# Patient Record
Sex: FEMALE | Race: BLACK OR AFRICAN AMERICAN | NOT HISPANIC OR LATINO | Employment: OTHER | URBAN - METROPOLITAN AREA
[De-identification: names, ages, dates, MRNs, and addresses within clinical notes are randomized per-mention and may not be internally consistent; named-entity substitution may affect disease eponyms.]

---

## 2023-10-12 ENCOUNTER — HOSPITAL ENCOUNTER (OUTPATIENT)
Facility: OTHER | Age: 53
Discharge: HOSPICE | End: 2023-10-14
Attending: EMERGENCY MEDICINE | Admitting: EMERGENCY MEDICINE

## 2023-10-12 DIAGNOSIS — K66.8 PNEUMOPERITONEUM: ICD-10-CM

## 2023-10-12 LAB
BASOPHILS # BLD AUTO: 0.02 K/UL (ref 0–0.2)
BASOPHILS NFR BLD: 0.1 % (ref 0–1.9)
DIFFERENTIAL METHOD: ABNORMAL
EOSINOPHIL # BLD AUTO: 0 K/UL (ref 0–0.5)
EOSINOPHIL NFR BLD: 0.1 % (ref 0–8)
ERYTHROCYTE [DISTWIDTH] IN BLOOD BY AUTOMATED COUNT: 17.2 % (ref 11.5–14.5)
HCT VFR BLD AUTO: 39.9 % (ref 37–48.5)
HGB BLD-MCNC: 12.4 G/DL (ref 12–16)
IMM GRANULOCYTES # BLD AUTO: 0.12 K/UL (ref 0–0.04)
IMM GRANULOCYTES NFR BLD AUTO: 0.6 % (ref 0–0.5)
LYMPHOCYTES # BLD AUTO: 0.4 K/UL (ref 1–4.8)
LYMPHOCYTES NFR BLD: 2.1 % (ref 18–48)
MCH RBC QN AUTO: 29 PG (ref 27–31)
MCHC RBC AUTO-ENTMCNC: 31.1 G/DL (ref 32–36)
MCV RBC AUTO: 93 FL (ref 82–98)
MONOCYTES # BLD AUTO: 0.7 K/UL (ref 0.3–1)
MONOCYTES NFR BLD: 3.6 % (ref 4–15)
NEUTROPHILS # BLD AUTO: 17.9 K/UL (ref 1.8–7.7)
NEUTROPHILS NFR BLD: 93.5 % (ref 38–73)
NRBC BLD-RTO: 0 /100 WBC
PLATELET # BLD AUTO: 199 K/UL (ref 150–450)
PMV BLD AUTO: 11.2 FL (ref 9.2–12.9)
RBC # BLD AUTO: 4.28 M/UL (ref 4–5.4)
WBC # BLD AUTO: 19.11 K/UL (ref 3.9–12.7)

## 2023-10-12 PROCEDURE — 80053 COMPREHEN METABOLIC PANEL: CPT | Performed by: EMERGENCY MEDICINE

## 2023-10-12 PROCEDURE — 25000003 PHARM REV CODE 250: Performed by: EMERGENCY MEDICINE

## 2023-10-12 PROCEDURE — 99285 EMERGENCY DEPT VISIT HI MDM: CPT

## 2023-10-12 PROCEDURE — 85025 COMPLETE CBC W/AUTO DIFF WBC: CPT | Performed by: EMERGENCY MEDICINE

## 2023-10-12 RX ORDER — OXYCODONE AND ACETAMINOPHEN 5; 325 MG/1; MG/1
1 TABLET ORAL
Status: COMPLETED | OUTPATIENT
Start: 2023-10-12 | End: 2023-10-12

## 2023-10-12 RX ORDER — HYOSCYAMINE SULFATE 0.12 MG/1
0.12 TABLET SUBLINGUAL EVERY 4 HOURS PRN
COMMUNITY

## 2023-10-12 RX ORDER — MORPHINE SULFATE 30 MG/1
30 TABLET, FILM COATED, EXTENDED RELEASE ORAL 2 TIMES DAILY
COMMUNITY

## 2023-10-12 RX ORDER — OXYCODONE AND ACETAMINOPHEN 10; 325 MG/1; MG/1
1 TABLET ORAL EVERY 8 HOURS PRN
COMMUNITY

## 2023-10-12 RX ADMIN — OXYCODONE HYDROCHLORIDE AND ACETAMINOPHEN 1 TABLET: 5; 325 TABLET ORAL at 11:10

## 2023-10-13 PROBLEM — K66.8 PNEUMOPERITONEUM: Status: ACTIVE | Noted: 2023-10-13

## 2023-10-13 PROBLEM — E43 SEVERE PROTEIN-CALORIE MALNUTRITION: Status: ACTIVE | Noted: 2023-10-13

## 2023-10-13 PROBLEM — C78.7 METASTATIC COLON CANCER TO LIVER: Status: ACTIVE | Noted: 2023-10-13

## 2023-10-13 PROBLEM — C18.9 METASTATIC COLON CANCER TO LIVER: Status: ACTIVE | Noted: 2023-10-13

## 2023-10-13 PROBLEM — Z71.89 ADVANCE CARE PLANNING: Status: ACTIVE | Noted: 2023-10-13

## 2023-10-13 PROBLEM — N17.9 ACUTE KIDNEY INJURY: Status: ACTIVE | Noted: 2023-10-13

## 2023-10-13 LAB
ALBUMIN SERPL BCP-MCNC: 2 G/DL (ref 3.5–5.2)
ALBUMIN SERPL BCP-MCNC: 2 G/DL (ref 3.5–5.2)
ALP SERPL-CCNC: 185 U/L (ref 55–135)
ALP SERPL-CCNC: 191 U/L (ref 55–135)
ALT SERPL W/O P-5'-P-CCNC: 10 U/L (ref 10–44)
ALT SERPL W/O P-5'-P-CCNC: 9 U/L (ref 10–44)
ANION GAP SERPL CALC-SCNC: 12 MMOL/L (ref 8–16)
ANION GAP SERPL CALC-SCNC: 15 MMOL/L (ref 8–16)
AST SERPL-CCNC: 24 U/L (ref 10–40)
AST SERPL-CCNC: 27 U/L (ref 10–40)
BASOPHILS # BLD AUTO: 0.01 K/UL (ref 0–0.2)
BASOPHILS NFR BLD: 0.1 % (ref 0–1.9)
BILIRUB SERPL-MCNC: 0.3 MG/DL (ref 0.1–1)
BILIRUB SERPL-MCNC: 0.4 MG/DL (ref 0.1–1)
BUN SERPL-MCNC: 64 MG/DL (ref 6–20)
BUN SERPL-MCNC: 65 MG/DL (ref 6–20)
CALCIUM SERPL-MCNC: 8.4 MG/DL (ref 8.7–10.5)
CALCIUM SERPL-MCNC: 8.5 MG/DL (ref 8.7–10.5)
CHLORIDE SERPL-SCNC: 102 MMOL/L (ref 95–110)
CHLORIDE SERPL-SCNC: 103 MMOL/L (ref 95–110)
CO2 SERPL-SCNC: 21 MMOL/L (ref 23–29)
CO2 SERPL-SCNC: 22 MMOL/L (ref 23–29)
CREAT SERPL-MCNC: 2.1 MG/DL (ref 0.5–1.4)
CREAT SERPL-MCNC: 2.1 MG/DL (ref 0.5–1.4)
DIFFERENTIAL METHOD: ABNORMAL
EOSINOPHIL # BLD AUTO: 0 K/UL (ref 0–0.5)
EOSINOPHIL NFR BLD: 0.2 % (ref 0–8)
ERYTHROCYTE [DISTWIDTH] IN BLOOD BY AUTOMATED COUNT: 17.3 % (ref 11.5–14.5)
EST. GFR  (NO RACE VARIABLE): 27 ML/MIN/1.73 M^2
EST. GFR  (NO RACE VARIABLE): 27 ML/MIN/1.73 M^2
GLUCOSE SERPL-MCNC: 91 MG/DL (ref 70–110)
GLUCOSE SERPL-MCNC: 98 MG/DL (ref 70–110)
HCT VFR BLD AUTO: 38.6 % (ref 37–48.5)
HGB BLD-MCNC: 11.8 G/DL (ref 12–16)
IMM GRANULOCYTES # BLD AUTO: 0.06 K/UL (ref 0–0.04)
IMM GRANULOCYTES NFR BLD AUTO: 0.4 % (ref 0–0.5)
LDH SERPL L TO P-CCNC: 1.56 MMOL/L (ref 0.5–2.2)
LYMPHOCYTES # BLD AUTO: 0.4 K/UL (ref 1–4.8)
LYMPHOCYTES NFR BLD: 2.8 % (ref 18–48)
MAGNESIUM SERPL-MCNC: 2.4 MG/DL (ref 1.6–2.6)
MCH RBC QN AUTO: 29.1 PG (ref 27–31)
MCHC RBC AUTO-ENTMCNC: 30.6 G/DL (ref 32–36)
MCV RBC AUTO: 95 FL (ref 82–98)
MONOCYTES # BLD AUTO: 0.6 K/UL (ref 0.3–1)
MONOCYTES NFR BLD: 4 % (ref 4–15)
NEUTROPHILS # BLD AUTO: 12.6 K/UL (ref 1.8–7.7)
NEUTROPHILS NFR BLD: 92.5 % (ref 38–73)
NRBC BLD-RTO: 0 /100 WBC
PHOSPHATE SERPL-MCNC: 5.2 MG/DL (ref 2.7–4.5)
PLATELET # BLD AUTO: 119 K/UL (ref 150–450)
PMV BLD AUTO: 10.6 FL (ref 9.2–12.9)
POTASSIUM SERPL-SCNC: 4 MMOL/L (ref 3.5–5.1)
POTASSIUM SERPL-SCNC: 4.6 MMOL/L (ref 3.5–5.1)
PROT SERPL-MCNC: 5.9 G/DL (ref 6–8.4)
PROT SERPL-MCNC: 6.1 G/DL (ref 6–8.4)
RBC # BLD AUTO: 4.06 M/UL (ref 4–5.4)
SAMPLE: NORMAL
SODIUM SERPL-SCNC: 137 MMOL/L (ref 136–145)
SODIUM SERPL-SCNC: 138 MMOL/L (ref 136–145)
WBC # BLD AUTO: 13.66 K/UL (ref 3.9–12.7)

## 2023-10-13 PROCEDURE — 99223 1ST HOSP IP/OBS HIGH 75: CPT | Mod: ,,, | Performed by: NURSE PRACTITIONER

## 2023-10-13 PROCEDURE — 99497 ADVNCD CARE PLAN 30 MIN: CPT | Mod: 25,,, | Performed by: INTERNAL MEDICINE

## 2023-10-13 PROCEDURE — G0378 HOSPITAL OBSERVATION PER HR: HCPCS

## 2023-10-13 PROCEDURE — 99233 PR SUBSEQUENT HOSPITAL CARE,LEVL III: ICD-10-PCS | Mod: ,,, | Performed by: STUDENT IN AN ORGANIZED HEALTH CARE EDUCATION/TRAINING PROGRAM

## 2023-10-13 PROCEDURE — 80053 COMPREHEN METABOLIC PANEL: CPT | Performed by: NURSE PRACTITIONER

## 2023-10-13 PROCEDURE — 99223 PR INITIAL HOSPITAL CARE,LEVL III: ICD-10-PCS | Mod: ,,, | Performed by: NURSE PRACTITIONER

## 2023-10-13 PROCEDURE — 96375 TX/PRO/DX INJ NEW DRUG ADDON: CPT

## 2023-10-13 PROCEDURE — 25000003 PHARM REV CODE 250: Performed by: EMERGENCY MEDICINE

## 2023-10-13 PROCEDURE — 63600175 PHARM REV CODE 636 W HCPCS: Performed by: STUDENT IN AN ORGANIZED HEALTH CARE EDUCATION/TRAINING PROGRAM

## 2023-10-13 PROCEDURE — 85025 COMPLETE CBC W/AUTO DIFF WBC: CPT | Performed by: NURSE PRACTITIONER

## 2023-10-13 PROCEDURE — 87040 BLOOD CULTURE FOR BACTERIA: CPT | Performed by: EMERGENCY MEDICINE

## 2023-10-13 PROCEDURE — 25000003 PHARM REV CODE 250: Performed by: NURSE PRACTITIONER

## 2023-10-13 PROCEDURE — 83735 ASSAY OF MAGNESIUM: CPT | Performed by: NURSE PRACTITIONER

## 2023-10-13 PROCEDURE — 36415 COLL VENOUS BLD VENIPUNCTURE: CPT | Performed by: NURSE PRACTITIONER

## 2023-10-13 PROCEDURE — 99205 OFFICE O/P NEW HI 60 MIN: CPT | Mod: ,,, | Performed by: INTERNAL MEDICINE

## 2023-10-13 PROCEDURE — 99233 SBSQ HOSP IP/OBS HIGH 50: CPT | Mod: ,,, | Performed by: STUDENT IN AN ORGANIZED HEALTH CARE EDUCATION/TRAINING PROGRAM

## 2023-10-13 PROCEDURE — 63600175 PHARM REV CODE 636 W HCPCS: Performed by: NURSE PRACTITIONER

## 2023-10-13 PROCEDURE — 63600175 PHARM REV CODE 636 W HCPCS: Performed by: EMERGENCY MEDICINE

## 2023-10-13 PROCEDURE — 99497 PR ADVNCD CARE PLAN 30 MIN: ICD-10-PCS | Mod: 25,,, | Performed by: INTERNAL MEDICINE

## 2023-10-13 PROCEDURE — 96366 THER/PROPH/DIAG IV INF ADDON: CPT

## 2023-10-13 PROCEDURE — 99205 PR OFFICE/OUTPT VISIT, NEW, LEVL V, 60-74 MIN: ICD-10-PCS | Mod: ,,, | Performed by: INTERNAL MEDICINE

## 2023-10-13 PROCEDURE — 25000003 PHARM REV CODE 250: Performed by: STUDENT IN AN ORGANIZED HEALTH CARE EDUCATION/TRAINING PROGRAM

## 2023-10-13 PROCEDURE — 96365 THER/PROPH/DIAG IV INF INIT: CPT

## 2023-10-13 PROCEDURE — 96376 TX/PRO/DX INJ SAME DRUG ADON: CPT

## 2023-10-13 PROCEDURE — 96361 HYDRATE IV INFUSION ADD-ON: CPT

## 2023-10-13 PROCEDURE — 84100 ASSAY OF PHOSPHORUS: CPT | Performed by: NURSE PRACTITIONER

## 2023-10-13 RX ORDER — ONDANSETRON 2 MG/ML
4 INJECTION INTRAMUSCULAR; INTRAVENOUS EVERY 6 HOURS PRN
Status: DISCONTINUED | OUTPATIENT
Start: 2023-10-13 | End: 2023-10-14 | Stop reason: HOSPADM

## 2023-10-13 RX ORDER — NALOXONE HCL 0.4 MG/ML
0.02 VIAL (ML) INJECTION
Status: DISCONTINUED | OUTPATIENT
Start: 2023-10-13 | End: 2023-10-14 | Stop reason: HOSPADM

## 2023-10-13 RX ORDER — OXYCODONE AND ACETAMINOPHEN 10; 325 MG/1; MG/1
1 TABLET ORAL EVERY 8 HOURS PRN
Status: DISCONTINUED | OUTPATIENT
Start: 2023-10-13 | End: 2023-10-14

## 2023-10-13 RX ORDER — SODIUM CHLORIDE 9 MG/ML
INJECTION, SOLUTION INTRAVENOUS CONTINUOUS
Status: DISCONTINUED | OUTPATIENT
Start: 2023-10-13 | End: 2023-10-14 | Stop reason: HOSPADM

## 2023-10-13 RX ORDER — ONDANSETRON 2 MG/ML
4 INJECTION INTRAMUSCULAR; INTRAVENOUS EVERY 8 HOURS PRN
Status: DISCONTINUED | OUTPATIENT
Start: 2023-10-13 | End: 2023-10-13

## 2023-10-13 RX ORDER — MORPHINE SULFATE 15 MG/1
30 TABLET, FILM COATED, EXTENDED RELEASE ORAL 2 TIMES DAILY
Status: DISCONTINUED | OUTPATIENT
Start: 2023-10-13 | End: 2023-10-14 | Stop reason: HOSPADM

## 2023-10-13 RX ORDER — SODIUM CHLORIDE 450 MG/100ML
INJECTION, SOLUTION INTRAVENOUS
Status: DISCONTINUED | OUTPATIENT
Start: 2023-10-13 | End: 2023-10-13

## 2023-10-13 RX ORDER — MIDODRINE HYDROCHLORIDE 10 MG/1
10 TABLET ORAL 3 TIMES DAILY
COMMUNITY

## 2023-10-13 RX ORDER — SODIUM CHLORIDE 9 MG/ML
1000 INJECTION, SOLUTION INTRAVENOUS
Status: COMPLETED | OUTPATIENT
Start: 2023-10-13 | End: 2023-10-13

## 2023-10-13 RX ORDER — MORPHINE SULFATE 4 MG/ML
4 INJECTION, SOLUTION INTRAMUSCULAR; INTRAVENOUS EVERY 4 HOURS PRN
Status: DISCONTINUED | OUTPATIENT
Start: 2023-10-13 | End: 2023-10-14 | Stop reason: HOSPADM

## 2023-10-13 RX ORDER — ACETAMINOPHEN 325 MG/1
650 TABLET ORAL EVERY 4 HOURS PRN
Status: DISCONTINUED | OUTPATIENT
Start: 2023-10-13 | End: 2023-10-14 | Stop reason: HOSPADM

## 2023-10-13 RX ORDER — MORPHINE SULFATE 2 MG/ML
2 INJECTION, SOLUTION INTRAMUSCULAR; INTRAVENOUS
Status: COMPLETED | OUTPATIENT
Start: 2023-10-13 | End: 2023-10-13

## 2023-10-13 RX ORDER — SODIUM CHLORIDE 0.9 % (FLUSH) 0.9 %
10 SYRINGE (ML) INJECTION EVERY 8 HOURS PRN
Status: DISCONTINUED | OUTPATIENT
Start: 2023-10-13 | End: 2023-10-14 | Stop reason: HOSPADM

## 2023-10-13 RX ORDER — MIDODRINE HYDROCHLORIDE 5 MG/1
5 TABLET ORAL EVERY 8 HOURS
Status: DISCONTINUED | OUTPATIENT
Start: 2023-10-13 | End: 2023-10-14 | Stop reason: HOSPADM

## 2023-10-13 RX ADMIN — MIDODRINE HYDROCHLORIDE 5 MG: 5 TABLET ORAL at 09:10

## 2023-10-13 RX ADMIN — MIDODRINE HYDROCHLORIDE 5 MG: 5 TABLET ORAL at 05:10

## 2023-10-13 RX ADMIN — ONDANSETRON 4 MG: 2 INJECTION INTRAMUSCULAR; INTRAVENOUS at 09:10

## 2023-10-13 RX ADMIN — SODIUM CHLORIDE: 9 INJECTION, SOLUTION INTRAVENOUS at 10:10

## 2023-10-13 RX ADMIN — PIPERACILLIN AND TAZOBACTAM 4.5 G: 4; .5 INJECTION, POWDER, LYOPHILIZED, FOR SOLUTION INTRAVENOUS; PARENTERAL at 01:10

## 2023-10-13 RX ADMIN — OXYCODONE HYDROCHLORIDE AND ACETAMINOPHEN 1 TABLET: 10; 325 TABLET ORAL at 01:10

## 2023-10-13 RX ADMIN — ONDANSETRON 4 MG: 2 INJECTION INTRAMUSCULAR; INTRAVENOUS at 02:10

## 2023-10-13 RX ADMIN — MORPHINE SULFATE 4 MG: 4 INJECTION, SOLUTION INTRAMUSCULAR; INTRAVENOUS at 05:10

## 2023-10-13 RX ADMIN — SODIUM CHLORIDE: 9 INJECTION, SOLUTION INTRAVENOUS at 03:10

## 2023-10-13 RX ADMIN — PIPERACILLIN AND TAZOBACTAM 4.5 G: 4; .5 INJECTION, POWDER, LYOPHILIZED, FOR SOLUTION INTRAVENOUS; PARENTERAL at 02:10

## 2023-10-13 RX ADMIN — MORPHINE SULFATE 2 MG: 2 INJECTION, SOLUTION INTRAMUSCULAR; INTRAVENOUS at 01:10

## 2023-10-13 RX ADMIN — OXYCODONE HYDROCHLORIDE AND ACETAMINOPHEN 1 TABLET: 10; 325 TABLET ORAL at 09:10

## 2023-10-13 RX ADMIN — PROMETHAZINE HYDROCHLORIDE 6.25 MG: 25 INJECTION INTRAMUSCULAR; INTRAVENOUS at 05:10

## 2023-10-13 RX ADMIN — SODIUM CHLORIDE 1000 ML: 9 INJECTION, SOLUTION INTRAVENOUS at 02:10

## 2023-10-13 RX ADMIN — MIDODRINE HYDROCHLORIDE 5 MG: 5 TABLET ORAL at 02:10

## 2023-10-13 NOTE — SUBJECTIVE & OBJECTIVE
Past Medical History:   Diagnosis Date    Acute kidney injury superimposed on chronic kidney disease     Ascites     Colon cancer        History reviewed. No pertinent surgical history.    Review of patient's allergies indicates:  No Known Allergies    No current facility-administered medications on file prior to encounter.     Current Outpatient Medications on File Prior to Encounter   Medication Sig    hyoscyamine (LEVSIN/SL) 0.125 mg Subl Place 0.125 mg under the tongue every 4 (four) hours as needed.    midodrine (PROAMATINE) 10 MG tablet Take 10 mg by mouth 3 (three) times daily. Take one-half tablet (5mg total) by mouth in the morning and one-half tablet (5mg total) at noon and one-half tablet (5mg total) in the evening. Take with meals.    morphine (MS CONTIN) 30 MG 12 hr tablet Take 30 mg by mouth 2 (two) times daily.    oxyCODONE-acetaminophen (PERCOCET)  mg per tablet Take 1 tablet by mouth every 8 (eight) hours as needed for Pain.     Family History    None       Tobacco Use    Smoking status: Never    Smokeless tobacco: Never   Substance and Sexual Activity    Alcohol use: Never    Drug use: Never    Sexual activity: Not on file     Review of Systems   Constitutional:  Positive for appetite change and fatigue. Negative for activity change and fever.   HENT:  Negative for congestion, ear pain, rhinorrhea and sinus pressure.    Eyes:  Negative for pain and discharge.   Respiratory:  Negative for cough, chest tightness, shortness of breath and wheezing.    Cardiovascular:  Negative for chest pain and leg swelling.   Gastrointestinal:  Positive for abdominal pain, nausea and vomiting. Negative for abdominal distention and diarrhea.   Endocrine: Negative for cold intolerance and heat intolerance.   Genitourinary:  Negative for difficulty urinating, flank pain, frequency, hematuria and urgency.   Musculoskeletal:  Positive for myalgias. Negative for arthralgias and joint swelling.   Allergic/Immunologic:  Negative for environmental allergies and food allergies.   Neurological:  Negative for dizziness, weakness, light-headedness and headaches.   Hematological:  Does not bruise/bleed easily.   Psychiatric/Behavioral:  Negative for agitation, behavioral problems and decreased concentration.      Objective:     Vital Signs (Most Recent):  Temp: 97.4 °F (36.3 °C) (10/13/23 0300)  Pulse: 61 (10/13/23 0300)  Resp: 14 (10/13/23 0300)  BP: (!) 92/59 (10/13/23 0300)  SpO2: 98 % (10/13/23 0300) Vital Signs (24h Range):  Temp:  [97.4 °F (36.3 °C)-97.5 °F (36.4 °C)] 97.4 °F (36.3 °C)  Pulse:  [61-85] 61  Resp:  [14-18] 14  SpO2:  [98 %-100 %] 98 %  BP: (83-95)/(57-62) 92/59     Weight: 36.3 kg (80 lb)  Body mass index is 15.62 kg/m².     Physical Exam  Constitutional:       Appearance: She is well-developed and underweight. She is ill-appearing.   HENT:      Head: Normocephalic.   Eyes:      General:         Right eye: No discharge.         Left eye: No discharge.      Conjunctiva/sclera: Conjunctivae normal.   Cardiovascular:      Rate and Rhythm: Normal rate and regular rhythm.      Pulses:           Radial pulses are 2+ on the right side and 2+ on the left side.      Heart sounds: Normal heart sounds.   Pulmonary:      Effort: Pulmonary effort is normal. No respiratory distress.      Breath sounds: Normal breath sounds.   Abdominal:      General: Bowel sounds are decreased. There is no distension.      Palpations: Abdomen is soft.      Tenderness: There is generalized abdominal tenderness.   Musculoskeletal:         General: Normal range of motion.      Cervical back: Normal range of motion and neck supple.   Skin:     General: Skin is warm and dry.      Coloration: Skin is pale.   Neurological:      Mental Status: She is alert and oriented to person, place, and time.      GCS: GCS eye subscore is 4. GCS verbal subscore is 5. GCS motor subscore is 6.      Motor: Motor function is intact.   Psychiatric:         Mood and Affect:  Mood is depressed.         Speech: Speech is delayed.         Behavior: Behavior is withdrawn.         Thought Content: Thought content normal.                Significant Labs: All pertinent labs within the past 24 hours have been reviewed.  CBC:   Recent Labs   Lab 10/12/23  2337   WBC 19.11*   HGB 12.4   HCT 39.9        CMP:   Recent Labs   Lab 10/12/23  2337      K 4.6      CO2 21*   GLU 98   BUN 64*   CREATININE 2.1*   CALCIUM 8.5*   PROT 6.1   ALBUMIN 2.0*   BILITOT 0.4   ALKPHOS 191*   AST 27   ALT 10   ANIONGAP 15       Significant Imaging: I have reviewed all pertinent imaging results/findings within the past 24 hours.

## 2023-10-13 NOTE — PLAN OF CARE
SW met with patient, along with daughter, Chani, at bedside.    Patient is alert and oriented with no communication barriers. Patient is bed bound.     Patient verified address is correct on face sheet.    Patient pharmacy of choice is [facility].    Patient denies HH and a WC for DME use.    Patient daughter to provide a ride home at discharge.    CM team to continue to follow.   10/13/23 1009   Discharge Assessment   Assessment Type Discharge Planning Assessment   Confirmed/corrected address, phone number and insurance Yes   Confirmed Demographics Correct on Facesheet   Source of Information patient   Communicated RESHMA with patient/caregiver Date not available/Unable to determine   People in Home child(angelo), adult;grandchild(angelo)   Do you expect to return to your current living situation? No   Prior to hospitilization cognitive status: Alert/Oriented   Current cognitive status: Alert/Oriented   Walking or Climbing Stairs transferring difficulty, dependent   Dressing/Bathing dressing difficulty, dependent   Equipment Currently Used at Home wheelchair   Readmission within 30 days? Yes   Patient currently being followed by outpatient case management? No   Do you currently have service(s) that help you manage your care at home? No   Do you take prescription medications? Yes   Do you have any problems affording any of your prescribed medications? No   Is the patient taking medications as prescribed? yes   How do you get to doctors appointments? family or friend will provide   Are you on dialysis? No   Do you take coumadin? No   DME Needed Upon Discharge  none   Discharge Plan discussed with: Patient;Adult children   Transition of Care Barriers None   Discharge Plan A Hospice/home   Physical Activity   On average, how many days per week do you engage in moderate to strenuous exercise (like a brisk walk)? 0 days   On average, how many minutes do you engage in exercise at this level? 0 min   Financial Resource Strain    How hard is it for you to pay for the very basics like food, housing, medical care, and heating? Not hard   Housing Stability   In the last 12 months, was there a time when you were not able to pay the mortgage or rent on time? N   In the last 12 months, how many places have you lived? 2   In the last 12 months, was there a time when you did not have a steady place to sleep or slept in a shelter (including now)? N   Transportation Needs   In the past 12 months, has lack of transportation kept you from medical appointments or from getting medications? no   In the past 12 months, has lack of transportation kept you from meetings, work, or from getting things needed for daily living? No   Food Insecurity   Within the past 12 months, you worried that your food would run out before you got the money to buy more. Never true   Within the past 12 months, the food you bought just didn't last and you didn't have money to get more. Never true   Stress   Do you feel stress - tense, restless, nervous, or anxious, or unable to sleep at night because your mind is troubled all the time - these days? To some exte   Social Connections   In a typical week, how many times do you talk on the phone with family, friends, or neighbors? More than 3   How often do you get together with friends or relatives? Once   How often do you attend Episcopal or Mormonism services? Never   Do you belong to any clubs or organizations such as Episcopal groups, unions, fraternal or athletic groups, or school groups? No   How often do you attend meetings of the clubs or organizations you belong to? Never   Are you , , , , never , or living with a partner?    Alcohol Use   Q1: How often do you have a drink containing alcohol? Never   Q2: How many drinks containing alcohol do you have on a typical day when you are drinking? None   Q3: How often do you have six or more drinks on one occasion? Never   OTHER   Name(s) of  People in Home Eri (daughter) and grandchildren     [facility] - Med Surg (Mariya)  Initial Discharge Assessment       Primary Care Provider: No, Primary Doctor    Admission Diagnosis: Pneumoperitoneum [K66.8]    Admission Date: 10/12/2023  Expected Discharge Date:     Transition of Care Barriers: (P) None    Payor: /     Extended Emergency Contact Information  Primary Emergency Contact: eri abreu  Mobile Phone: [phone]  Relation: Daughter  Preferred language: English   needed? No    Discharge Plan A: (P) Hospice/home       No Pharmacies Listed    Initial Assessment (most recent)       Adult Discharge Assessment - 10/13/23 1009          Discharge Assessment    Assessment Type Discharge Planning Assessment (P)      Confirmed/corrected address, phone number and insurance Yes (P)      Confirmed Demographics Correct on Facesheet (P)      Source of Information patient (P)      Communicated RESHMA with patient/caregiver Date not available/Unable to determine (P)      People in Home child(angelo), adult;grandchild(angelo) (P)      Do you expect to return to your current living situation? No (P)      Prior to hospitilization cognitive status: Alert/Oriented (P)      Current cognitive status: Alert/Oriented (P)      Walking or Climbing Stairs transferring difficulty, dependent (P)      Dressing/Bathing dressing difficulty, dependent (P)      Equipment Currently Used at Home wheelchair (P)      Readmission within 30 days? Yes (P)      Patient currently being followed by outpatient case management? No (P)      Do you currently have service(s) that help you manage your care at home? No (P)      Do you take prescription medications? Yes (P)      Do you have any problems affording any of your prescribed medications? No (P)      Is the patient taking medications as prescribed? yes (P)      How do you get to doctors appointments? family or friend will provide (P)      Are you on dialysis? No (P)      Do you take  coumadin? No (P)      DME Needed Upon Discharge  none (P)      Discharge Plan discussed with: Patient;Adult children (P)      Transition of Care Barriers None (P)      Discharge Plan A Hospice/home (P)         Physical Activity    On average, how many days per week do you engage in moderate to strenuous exercise (like a brisk walk)? 0 days (P)      On average, how many minutes do you engage in exercise at this level? 0 min (P)         Financial Resource Strain    How hard is it for you to pay for the very basics like food, housing, medical care, and heating? Not hard at all (P)         Housing Stability    In the last 12 months, was there a time when you were not able to pay the mortgage or rent on time? No (P)      In the last 12 months, how many places have you lived? 2 (P)      In the last 12 months, was there a time when you did not have a steady place to sleep or slept in a shelter (including now)? No (P)         Transportation Needs    In the past 12 months, has lack of transportation kept you from medical appointments or from getting medications? No (P)      In the past 12 months, has lack of transportation kept you from meetings, work, or from getting things needed for daily living? No (P)         Food Insecurity    Within the past 12 months, you worried that your food would run out before you got the money to buy more. Never true (P)      Within the past 12 months, the food you bought just didn't last and you didn't have money to get more. Never true (P)         Stress    Do you feel stress - tense, restless, nervous, or anxious, or unable to sleep at night because your mind is troubled all the time - these days? To some extent (P)         Social Connections    In a typical week, how many times do you talk on the phone with family, friends, or neighbors? More than three times a week (P)      How often do you get together with friends or relatives? Once a week (P)      How often do you attend Muslim or  Sabianist services? Never (P)      Do you belong to any clubs or organizations such as Nondenominational groups, unions, fraternal or athletic groups, or school groups? No (P)      How often do you attend meetings of the clubs or organizations you belong to? Never (P)      Are you , , , , never , or living with a partner?  (P)         Alcohol Use    Q1: How often do you have a drink containing alcohol? Never (P)      Q2: How many drinks containing alcohol do you have on a typical day when you are drinking? Patient does not drink (P)      Q3: How often do you have six or more drinks on one occasion? Never (P)         OTHER    Name(s) of People in Home Chani (daughter) and grandchildren (P)

## 2023-10-13 NOTE — ASSESSMENT & PLAN NOTE
- Noted on imaging, though unclear chronicity given that pt is currently alert and talking and pain is reasonably controlled

## 2023-10-13 NOTE — ASSESSMENT & PLAN NOTE
Patient with acute kidney injury/acute renal failure likely due to pre-renal azotemia due to IVVD ALEXIS is currently stable. Baseline creatinine unknown - Labs reviewed- Renal function/electrolytes with Estimated Creatinine Clearance: 17.5 mL/min (A) (based on SCr of 2.1 mg/dL (H)). according to latest data. Monitor urine output and serial BMP and adjust therapy as needed. Avoid nephrotoxins and renally dose meds for GFR listed above.

## 2023-10-13 NOTE — ASSESSMENT & PLAN NOTE
"- Consult for support and advance care planning in pt with complicated recent medical history due to metastatic colon cancer ([place] of [place]) who was reportely established with hospice for approximately a day prior to family relocating her to [place] area. Chart reviewed, and discussed pt in person with primary staff and SW/CM prior to visit.   - Visited with pt at bedside; while alert, she was receiving nursing care during our visit, so much of conversation was with her very supportive daughter Chani Ley ([phone]) at bedside, with her brother Maximilian Ley present via speakerphone. Of note, pt is still legally  to her , Jarvis Ley, who will likely be traveling in from [place] today.   - Reminded them of role of palliative medicine, and discussed plan and logistics moving forward. Chani voiced preference to move her mother into her home in [facility], with the additional support of hospice care.   - As she mentioned "driving her mother into [place] for medical appointments," we provided an extensive overview of what hospice support would look like in the home setting to ensure family has good understanding of this philosophy of care.   - She was concerned that pt does not have insurance; let her know we would further discuss this with SW/CM.   - As pt is currrently listed as full code, explained difference between this and DNR/DNI; strongly recommended that we change to her to the latter, given that CPR and/or intubation would not fix her underlying cancer, and would likely cause unnecessary suffering at the end of her life. Chani was very understanding of this and appropriately tearful at times; emotional support provided throughout visit.   - As pt expressed preference to "try to bring me back," let the family know we can continue this discussion later after they have some time to absorb above information.   - Updated primary staff by phone after " visit; will follow up with pt and family, likely this afternoon

## 2023-10-13 NOTE — CONSULTS
"[facility] ([place])  Palliative Medicine  Consult Note    Patient Name: Alex Ley  MRN: [id number]  Admission Date: 10/12/2023  Hospital Length of Stay: 0 days  Code Status: Full Code   Attending Provider: Wesly Bean MD  Consulting Provider: Rody Marquez MD  Primary Care Physician: Izabel, Primary Doctor  Principal Problem:Pneumoperitoneum    Patient information was obtained from patient, relative(s), past medical records, ER records and primary team.      Inpatient consult to Palliative Care  Consult performed by: Rody Marquez MD  Consult ordered by: Socrates Wiley NP  Reason for consult: Advance Care Planning        Assessment/Plan:     Advance Care Planning       10/13/23  - Consult for support and advance care planning in pt with complicated recent medical history due to metastatic colon cancer ([place] care [place] out of state) who was reportely established with hospice for approximately a day prior to family relocating her to [place] area. Chart reviewed, and discussed pt in person with primary staff and SW/CM prior to visit.   - Visited with pt at bedside; while alert, she was receiving nursing care during our visit, so much of conversation was with her very supportive daughter Chani Ley ([phone]) at bedside, with her brother Maximilian Ley present via speakerphone. Of note, pt is still legally  to her , Jarvis Ley, who will likely be traveling in from [place] today.   - Reminded them of role of palliative medicine, and discussed plan and logistics moving forward. Chani voiced preference to move her mother into her home in [facility], with the additional support of hospice care.   - As she mentioned "driving her mother into [place] for medical appointments," we provided an extensive overview of what hospice support would look like in the home setting to ensure family has good understanding of this philosophy of care.   - She was concerned " "that pt does not have insurance; let her know we would further discuss this with SW/CM.   - As pt is currrently listed as full code, explained difference between this and DNR/DNI; strongly recommended that we change to her to the latter, given that CPR and/or intubation would not fix her underlying cancer, and would likely cause unnecessary suffering at the end of her life. Chani was very understanding of this and appropriately tearful at times; emotional support provided throughout visit.   - As pt expressed preference to "try to bring me back," let the family know we can continue this discussion later after they have some time to absorb above information.   - Updated primary staff by phone after visit; will follow up with pt and family, likely this afternoon     Renal/  Acute kidney injury  - Cr 2.1 on recent labs; unclear baseline kidney function  - Due to low oncotic pressure, IV fluids may actually worsen her ascites and potentially result in pleural effusions; would be cautious with these     Oncology  Metastatic colon cancer to liver  - Received care for this in [place]; ultimately was not a candidate for further oncological treatment and hospice was arranged ([facility]). Family decided to move her to [place], and is interested in arranging hospice care, likely in [facility].   - Illness trajectory education provided, as well as extensive overview of hospice care     Endocrine  Severe protein-calorie malnutrition  - BMI 16, with visible cachexia   - Contributes to hospice qualification     GI  Pneumoperitoneum  - Noted on imaging, though unclear chronicity given that pt is currently alert and talking and pain is reasonably controlled    Thank you for your consult. I will follow-up with patient. Please contact us if you have any additional questions.     MANISH Chaidez  Palliative Medicine Staff   [phone]    Total visit time: 86 minutes    > 50% of 70 min visit spent in chart " "review, face to face discussion of symptom assessment, coordination of care with other specialists, and discharge planning.    16 min ACP time spent: goals of care, emotional support, formulating and communicating prognosis, exploring burden/ benefit of various approaches of treatment.     Subjective:     HPI: (From H&P) "The patient is a 53 y.o. female with a past medical history of colon cancer who presents with lower abdominal pain. Onset was 3 days ago. Patient describes her lower abdominal pain as a nagging pain and rates it a 10/10. Until today she had been living in [place] and receiving all of her care there. She just moved to [place] today to live with her daughter. She states that she was admitted to the hospital from April until today in which she was discharged. During that period of time she experienced similar lower abdominal pain for which she has been prescribed Percocet. Patient has a PSHx of a partial colectomy, and a PSHx of paracentesis that was performed 2 weeks ago. Patient states that she was diagnosed with colon cancer in April, and received chemotherapy. Patient granddaughter reports chemotherapy being stopped due to the patients overall strength. In addition, patient states that the last time her paracentesis drain was emptied was on Monday. She states she normally lays on her left side, but her drain terminates in the right lower quadrant. According to the patients son the patient drain usually fills up to about 300 ml-400 ml a day t.i.d. He states that he notices that the more she eats the more output is seen. Denies having any associated fever, vomiting, nausea, and any trouble having a BM.  The patient will be admitted for further management of her abdominal pain with Palliative Care consult and assistance with outpatient hospice arrangements."    Palliative medicine is consulted for advance care planning; see ACP section of plan.     Past Medical History:   Diagnosis Date    " Acute kidney injury superimposed on chronic kidney disease     Ascites     Colon cancer        History reviewed. No pertinent surgical history.    Review of patient's allergies indicates:  No Known Allergies    Medications:  Continuous Infusions:   sodium chloride 0.9% 75 mL/hr at 10/13/23 0355     Scheduled Meds:   midodrine  5 mg Oral Q8H    morphine  30 mg Oral BID    piperacillin-tazobactam (Zosyn) IV (PEDS and ADULTS) (extended infusion is not appropriate)  4.5 g Intravenous Q12H     PRN Meds:acetaminophen, morphine, naloxone, ondansetron, oxyCODONE-acetaminophen, sodium chloride 0.9%    Family History    None       Tobacco Use    Smoking status: Never    Smokeless tobacco: Never   Substance and Sexual Activity    Alcohol use: Never    Drug use: Never    Sexual activity: Not on file       Review of Systems   Constitutional:  Positive for fatigue and unexpected weight change.   Gastrointestinal:  Positive for abdominal distention and abdominal pain.     Objective:     Vital Signs (Most Recent):  Temp: 97.4 °F (36.3 °C) (10/13/23 0300)  Pulse: 67 (10/13/23 0831)  Resp: 15 (10/13/23 0742)  BP: (!) 87/59 (Rn shade notified) (10/13/23 0831)  SpO2: 100 % (10/13/23 0742) Vital Signs (24h Range):  Temp:  [97.4 °F (36.3 °C)-97.5 °F (36.4 °C)] 97.4 °F (36.3 °C)  Pulse:  [55-85] 67  Resp:  [14-18] 15  SpO2:  [98 %-100 %] 100 %  BP: (83-95)/(57-62) 87/59     Weight: 37.4 kg (82 lb 7.2 oz)  Body mass index is 16.1 kg/m².       Physical Exam  Constitutional:       Comments: Alert, visible cachexia, ill-appearing though appears comfortable    HENT:      Head:      Comments: Temporal wasting   Cardiovascular:      Rate and Rhythm: Normal rate.   Pulmonary:      Effort: Pulmonary effort is normal.   Neurological:      Comments: Able to speak in sentences       Significant Labs: All pertinent labs within the past 24 hours have been reviewed.  CBC:   Recent Labs   Lab 10/12/23  5427 10/13/23  0936   WBC 19.11* 13.66*  "  HGB 12.4 11.8*   HCT 39.9 38.6   MCV 93 95     --      BMP:  Recent Labs   Lab 10/13/23  0936   GLU 91      K 4.0      CO2 22*   BUN 65*   CREATININE 2.1*   CALCIUM 8.4*   MG 2.4     LFT:  Lab Results   Component Value Date    AST 24 10/13/2023    ALKPHOS 185 (H) 10/13/2023    BILITOT 0.3 10/13/2023     Albumin:   Albumin   Date Value Ref Range Status   10/13/2023 2.0 (L) 3.5 - 5.2 g/dL Final     Protein:   Total Protein   Date Value Ref Range Status   10/13/2023 5.9 (L) 6.0 - 8.4 g/dL Final     Lactic acid:   No results found for: "LACTATE"    Significant Imaging: I have reviewed all pertinent imaging results/findings within the past 24 hours.              "

## 2023-10-13 NOTE — NURSING
Phlebotomist notified RN that patient's daughter at bedside is deferring lab draw at this time. Per , pt's daughter asked her to come back later for lab draw. Pt in bed with eyes closed. No apparent distress noted.

## 2023-10-13 NOTE — H&P
[facility]  History & Physical    Patient Name: Alex Ley  MRN: [id number]  Patient Class: OP- Observation  Admission Date: 10/12/2023  Attending Physician: Wesly Bean MD   Primary Care Provider: Izabel Primary Doctor         Patient information was obtained from patient, relative(s) and ER records.     Subjective:     Principal Problem:Pneumoperitoneum    Chief Complaint:   Chief Complaint   Patient presents with    Abdominal Pain     Complaining of lower abd pain, pain near abd drain site. States drain output has decreased since Monday. Patient recently moved from [place] and does not have care established in [place]. Hx of colon cancer. Denies vomiting, diarrhea, fevers.        HPI: The patient is a 53 y.o. female with a past medical history of colon cancer who presents with lower abdominal pain. Onset was 3 days ago. Patient describes her lower abdominal pain as a nagging pain and rates it a 10/10. Until today she had been living in [place] and receiving all of her care there. She just moved to [place] today to live with her daughter. She states that she was admitted to the hospital from April until today in which she was discharged. During that period of time she experienced similar lower abdominal pain for which she has been prescribed Percocet. Patient has a PSHx of a partial colectomy, and a PSHx of paracentesis that was performed 2 weeks ago. Patient states that she was diagnosed with colon cancer in April, and received chemotherapy. Patient granddaughter reports chemotherapy being stopped due to the patients overall strength. In addition, patient states that the last time her paracentesis drain was emptied was on Monday. She states she normally lays on her left side, but her drain terminates in the right lower quadrant. According to the patients son the patient drain usually fills up to about 300 ml-400 ml a day t.i.d. He states that he notices  that the more she eats the more output is seen. Denies having any associated fever, vomiting, nausea, and any trouble having a BM.  The patient will be admitted for further management of her abdominal pain with Palliative Care consult and assistance with outpatient hospice arrangements.      Past Medical History:   Diagnosis Date    Acute kidney injury superimposed on chronic kidney disease     Ascites     Colon cancer        History reviewed. No pertinent surgical history.    Review of patient's allergies indicates:  No Known Allergies    No current facility-administered medications on file prior to encounter.     Current Outpatient Medications on File Prior to Encounter   Medication Sig    hyoscyamine (LEVSIN/SL) 0.125 mg Subl Place 0.125 mg under the tongue every 4 (four) hours as needed.    midodrine (PROAMATINE) 10 MG tablet Take 10 mg by mouth 3 (three) times daily. Take one-half tablet (5mg total) by mouth in the morning and one-half tablet (5mg total) at noon and one-half tablet (5mg total) in the evening. Take with meals.    morphine (MS CONTIN) 30 MG 12 hr tablet Take 30 mg by mouth 2 (two) times daily.    oxyCODONE-acetaminophen (PERCOCET)  mg per tablet Take 1 tablet by mouth every 8 (eight) hours as needed for Pain.     Family History    None       Tobacco Use    Smoking status: Never    Smokeless tobacco: Never   Substance and Sexual Activity    Alcohol use: Never    Drug use: Never    Sexual activity: Not on file     Review of Systems   Constitutional:  Positive for appetite change and fatigue. Negative for activity change and fever.   HENT:  Negative for congestion, ear pain, rhinorrhea and sinus pressure.    Eyes:  Negative for pain and discharge.   Respiratory:  Negative for cough, chest tightness, shortness of breath and wheezing.    Cardiovascular:  Negative for chest pain and leg swelling.   Gastrointestinal:  Positive for abdominal pain, nausea and vomiting. Negative for  abdominal distention and diarrhea.   Endocrine: Negative for cold intolerance and heat intolerance.   Genitourinary:  Negative for difficulty urinating, flank pain, frequency, hematuria and urgency.   Musculoskeletal:  Positive for myalgias. Negative for arthralgias and joint swelling.   Allergic/Immunologic: Negative for environmental allergies and food allergies.   Neurological:  Negative for dizziness, weakness, light-headedness and headaches.   Hematological:  Does not bruise/bleed easily.   Psychiatric/Behavioral:  Negative for agitation, behavioral problems and decreased concentration.      Objective:     Vital Signs (Most Recent):  Temp: 97.4 °F (36.3 °C) (10/13/23 0300)  Pulse: 61 (10/13/23 0300)  Resp: 14 (10/13/23 0300)  BP: (!) 92/59 (10/13/23 0300)  SpO2: 98 % (10/13/23 0300) Vital Signs (24h Range):  Temp:  [97.4 °F (36.3 °C)-97.5 °F (36.4 °C)] 97.4 °F (36.3 °C)  Pulse:  [61-85] 61  Resp:  [14-18] 14  SpO2:  [98 %-100 %] 98 %  BP: (83-95)/(57-62) 92/59     Weight: 36.3 kg (80 lb)  Body mass index is 15.62 kg/m².     Physical Exam  Constitutional:       Appearance: She is well-developed and underweight. She is ill-appearing.   HENT:      Head: Normocephalic.   Eyes:      General:         Right eye: No discharge.         Left eye: No discharge.      Conjunctiva/sclera: Conjunctivae normal.   Cardiovascular:      Rate and Rhythm: Normal rate and regular rhythm.      Pulses:           Radial pulses are 2+ on the right side and 2+ on the left side.      Heart sounds: Normal heart sounds.   Pulmonary:      Effort: Pulmonary effort is normal. No respiratory distress.      Breath sounds: Normal breath sounds.   Abdominal:      General: Bowel sounds are decreased. There is no distension.      Palpations: Abdomen is soft.      Tenderness: There is generalized abdominal tenderness.   Musculoskeletal:         General: Normal range of motion.      Cervical back: Normal range of motion and neck supple.   Skin:      General: Skin is warm and dry.      Coloration: Skin is pale.   Neurological:      Mental Status: She is alert and oriented to person, place, and time.      GCS: GCS eye subscore is 4. GCS verbal subscore is 5. GCS motor subscore is 6.      Motor: Motor function is intact.   Psychiatric:         Mood and Affect: Mood is depressed.         Speech: Speech is delayed.         Behavior: Behavior is withdrawn.         Thought Content: Thought content normal.                Significant Labs: All pertinent labs within the past 24 hours have been reviewed.  CBC:   Recent Labs   Lab 10/12/23  2337   WBC 19.11*   HGB 12.4   HCT 39.9        CMP:   Recent Labs   Lab 10/12/23  2337      K 4.6      CO2 21*   GLU 98   BUN 64*   CREATININE 2.1*   CALCIUM 8.5*   PROT 6.1   ALBUMIN 2.0*   BILITOT 0.4   ALKPHOS 191*   AST 27   ALT 10   ANIONGAP 15       Significant Imaging: I have reviewed all pertinent imaging results/findings within the past 24 hours.    Assessment/Plan:     * Pneumoperitoneum  CT- Large pneumoperitoneum.  Partial colectomy.  Question the integrity of the suture material in the left upper abdomen.  Multiple low-attenuation hepatic lesions.  Findings concerning for liver metastases.  Small gallbladder sludge or gravel-like gallstones.  Large abdominopelvic free fluid.  Anasarca.  Large soft tissue density in the retroperitoneum.  Findings may be due to extensive adenopathy, evolved hematoma or decompressed bowel.    IVF  Clear liquid diet  Morphine/Percocet/IV morphine  Elevated WBC- Zosyn for now  Increasing abdominal pain, likely all due to progressing colon ca  Palliative Care/Social Work consult to assist with setting up Hospice in the area.          Acute kidney injury  Patient with acute kidney injury/acute renal failure likely due to pre-renal azotemia due to IVVD ALEXIS is currently stable. Baseline creatinine unknown - Labs reviewed- Renal function/electrolytes with Estimated Creatinine  Clearance: 17.5 mL/min (A) (based on SCr of 2.1 mg/dL (H)). according to latest data. Monitor urine output and serial BMP and adjust therapy as needed. Avoid nephrotoxins and renally dose meds for GFR listed above.      VTE Risk Mitigation (From admission, onward)         Ordered     IP VTE LOW RISK PATIENT  Once         10/13/23 0313     Place sequential compression device  Until discontinued         10/13/23 0313     Reason for No Pharmacological VTE Prophylaxis  Once        Question:  Reasons:  Answer:  Risk of Bleeding    10/13/23 0313                       Socrates Wiley NP  Department of Hospital Medicine  [facility] ([place])

## 2023-10-13 NOTE — CONSULTS
[facility] - Med Surg ([place])  Wound Care    Patient Name:  Alex Ley   MRN:  [id number]  Date: 10/13/2023  Diagnosis: Pneumoperitoneum    History:     Past Medical History:   Diagnosis Date    Acute kidney injury superimposed on chronic kidney disease     Ascites     Colon cancer        Social History     Socioeconomic History    Marital status: Single   Tobacco Use    Smoking status: Never    Smokeless tobacco: Never   Substance and Sexual Activity    Alcohol use: Never    Drug use: Never     Social Determinants of Health     Financial Resource Strain: Low Risk  (10/13/2023)    Overall Financial Resource Strain (CARDIA)     Difficulty of Paying Living Expenses: Not hard at all   Food Insecurity: No Food Insecurity (10/13/2023)    Hunger Vital Sign     Worried About Running Out of Food in the Last Year: Never true     Ran Out of Food in the Last Year: Never true   Transportation Needs: No Transportation Needs (10/13/2023)    PRAPARE - Transportation     Lack of Transportation (Medical): No     Lack of Transportation (Non-Medical): No   Physical Activity: Inactive (10/13/2023)    Exercise Vital Sign     Days of Exercise per Week: 0 days     Minutes of Exercise per Session: 0 min   Stress: Stress Concern Present (10/13/2023)    [employer] - Occupational Stress Questionnaire     Feeling of Stress : To some extent   Social Connections: Moderately Isolated (10/13/2023)    Social Connection and Isolation Panel [NHANES]     Frequency of Communication with Friends and Family: More than three times a week     Frequency of Social Gatherings with Friends and Family: Once a week     Attends [employer] Services: Never     Active Member of Clubs or Organizations: No     Attends Club or Organization Meetings: Never     Marital Status:    Housing Stability: Low Risk  (10/13/2023)    Housing Stability Vital Sign     Unable to Pay for Housing in the Last Year: No     Number of Places Lived in the  Last Year: 2     Unstable Housing in the Last Year: No           [facility] Assessment Details:        10/13/23 1355        Altered Skin Integrity 10/13/23 midline Coccyx Ulceration Partial thickness tissue loss. Shallow open ulcer with a red or pink wound bed, without slough. Intact or Open/Ruptured Serum-filled blister.   Date First Assessed: 10/13/23   Altered Skin Integrity Present on Admission - Did Patient arrive to the hospital with altered skin?: yes  Orientation: midline  Location: Coccyx  Primary Wound Type: (c) Ulceration  Description of Altered Skin Integrity...   Wound Image    Dressing Appearance Dried drainage   Drainage Amount Small   Appearance Dressing in place, unable to visualize   Tissue loss description Not applicable   Periwound Area Scar tissue   Dressing Changed;Applied   Periwound Care Skin barrier film applied;Dry periwound area maintained;Cleansed with pH balanced cleanser     Provided perineal care. Removed the external female catheter related to fecal contamination the use is contraindicated.  Healing PI to the Coccyx cleansed with Vashe Triad paste Applied and protected with Silicone foam dressing.  Compasses at bedside for assessment. Recommendations made and Orders Entered.  Maintain suppoert surface and PIP measures implemented per NursingKalie.    10/13/2023

## 2023-10-13 NOTE — PLAN OF CARE
POC reviewed with patient awake and alert. Purposeful rounding completed. VS and assessment per flowsheet. NS infusing at 75 mL/HR. No significant events this shift. No injuries, falls, or trauma occurred during shift. Bed low and locked, side rails up x2, call light within reach.  Daughter at bedside. Safety maintained throughout shift        Problem: Adult Inpatient Plan of Care  Goal: Plan of Care Review  Outcome: Ongoing, Progressing  Goal: Patient-Specific Goal (Individualized)  Outcome: Ongoing, Progressing  Goal: Absence of Hospital-Acquired Illness or Injury  Outcome: Ongoing, Progressing  Goal: Optimal Comfort and Wellbeing  Outcome: Ongoing, Progressing  Goal: Readiness for Transition of Care  Outcome: Ongoing, Progressing     Problem: Fall Injury Risk  Goal: Absence of Fall and Fall-Related Injury  Outcome: Ongoing, Progressing

## 2023-10-13 NOTE — HPI
The patient is a 53 y.o. female with a past medical history of colon cancer who presents with lower abdominal pain. Onset was 3 days ago. Patient describes her lower abdominal pain as a nagging pain and rates it a 10/10. Until today she had been living in [place] and receiving all of her care there. She just moved to [place] today to live with her daughter. She states that she was admitted to the hospital from April until today in which she was discharged. During that period of time she experienced similar lower abdominal pain for which she has been prescribed Percocet. Patient has a PSHx of a partial colectomy, and a PSHx of paracentesis that was performed 2 weeks ago. Patient states that she was diagnosed with colon cancer in April, and received chemotherapy. Patient granddaughter reports chemotherapy being stopped due to the patients overall strength. In addition, patient states that the last time her paracentesis drain was emptied was on Monday. She states she normally lays on her left side, but her drain terminates in the right lower quadrant. According to the patients son the patient drain usually fills up to about 300 ml-400 ml a day t.i.d. He states that he notices that the more she eats the more output is seen. Denies having any associated fever, vomiting, nausea, and any trouble having a BM.  The patient will be admitted for further management of her abdominal pain with Palliative Care consult and assistance with outpatient hospice arrangements.

## 2023-10-13 NOTE — HOSPITAL COURSE
Pt stable. Alert and oriented with clear plans for hospice on discharge. Feeling nauseous other than that no complaints. Medciations were adjusted. Also managing pain. Has been meeting with , palliative and hospice throughout the day. Currently working on a safe discharge plan. Accepted with hospice with [facility] and plan for d/c home today. Pt had 1 bloody BM on morning of discharge - but given plan for home with hospice no further action was taken. Patient had no further bleeding. Discussed with patient and family in detail about this and they confirm their goals of comfort and home with hospice.

## 2023-10-13 NOTE — PLAN OF CARE
I certify I provided patient choice and a list to the patient/family of CMS rated Home Health, SNF, IRF, LTACH, correction Nursing Homes.  Patient/Family signed Patient's Choice Disclosure Form choosing the first available hospice agency.      10/13/23 1027   Post-Acute Status   Post-Acute Authorization Hospice   Hospice Status Referrals Sent   Patient choice form signed by patient/caregiver List with quality metrics by geographic area provided;List from CMS Compare;List from System Post-Acute Care   Discharge Delays (!) Post-Acute Set-up   Discharge Plan   Discharge Plan A Hospice/home

## 2023-10-13 NOTE — ASSESSMENT & PLAN NOTE
Nutrition consulted. Most recent weight and BMI monitored-     Measurements:  Wt Readings from Last 1 Encounters:   10/13/23 37.4 kg (82 lb 7.2 oz)   Body mass index is 16.1 kg/m².

## 2023-10-13 NOTE — ASSESSMENT & PLAN NOTE
Metastatic colon cancer to liver   Advanced care planning     CT- Large pneumoperitoneum.  Partial colectomy.  Question the integrity of the suture material in the left upper abdomen.  Multiple low-attenuation hepatic lesions.  Findings concerning for liver metastases.  Small gallbladder sludge or gravel-like gallstones.  Large abdominopelvic free fluid.  Anasarca.  Large soft tissue density in the retroperitoneum.  Findings may be due to extensive adenopathy, evolved hematoma or decompressed bowel.    Plan -   IVF  Clear liquid diet  Morphine/Percocet/IV morphine  Elevated WBC- Zosyn for now  Increasing abdominal pain, likely all due to progressing colon ca  Palliative Care/Social Work consult to assist with setting up Hospice in the area.   Antiemetics

## 2023-10-13 NOTE — NURSING
Night Nurse rommel gave  report pt did not urinated over night and performed bladder scan and showed more than 363 residual urine in bladder.     @ 7:30 AM Patient denies any pressure and urge to urinate.Informed Dr Bean via secure chat and ordered IN and Out.   Went to Patient room for the procedure.Explained pt and her daughter at bedside.Prepared pt , as soon as I cleaned her perineum with betadine pt goes on self all over linens.Patient said she sometime does not feels urge to pee.Unfortunately, could not collect urine sample at this time.Cleaned pt and Placed back on pure wick.    Patient BP running low.89/58 @7:42 .Rechecked @8.31 still 87/59. Informed . Ordered to hold her scheduled 9 am morphine.No other order at this time.

## 2023-10-13 NOTE — PLAN OF CARE
10/13/23 1223   Post-Acute Status   Post-Acute Authorization Hospice   Hospice Status Set-up Complete/Auth obtained   Discharge Delays None known at this time   Discharge Plan   Discharge Plan A Hospice/home     Patient has been accepted by [facility]. CM to continue to follow.

## 2023-10-13 NOTE — PLAN OF CARE
POC reviewed with patient. AAOx4. Stable on room air. Patient BP runs in lower side but now better than the morning.Complaints of pain and nausea x 2  treated with PRN pain/nausea medication according to MAR. Wound care consulted for sacral wound,placed silicon foam dressing. Patient continue to have LLQ drain, no leak detected in seal, dressing remains intact emptied 80 cc, cloudy drainage.Received IV antibiotics according to MAR. Pure wick utilized for voiding. Positions x 1 assist. Turn Q2/frequent weight shift encouraged by RN. No injuries, falls, or trauma occurred during shift. Purposeful rounding completed. Bed low and locked with side rails up x 3 and call light within reach.       Problem: Adult Inpatient Plan of Care  Goal: Plan of Care Review  Outcome: Ongoing, Progressing  Goal: Patient-Specific Goal (Individualized)  Outcome: Ongoing, Progressing  Goal: Absence of Hospital-Acquired Illness or Injury  Outcome: Ongoing, Progressing  Goal: Optimal Comfort and Wellbeing  Outcome: Ongoing, Progressing  Goal: Readiness for Transition of Care  Outcome: Ongoing, Progressing     Problem: Coping Ineffective  Goal: Effective Coping  Outcome: Ongoing, Progressing     Problem: Fluid and Electrolyte Imbalance (Acute Kidney Injury/Impairment)  Goal: Fluid and Electrolyte Balance  Outcome: Ongoing, Progressing     Problem: Oral Intake Inadequate (Acute Kidney Injury/Impairment)  Goal: Optimal Nutrition Intake  Outcome: Ongoing, Progressing     Problem: Renal Function Impairment (Acute Kidney Injury/Impairment)  Goal: Effective Renal Function  Outcome: Ongoing, Progressing     Problem: Skin Injury Risk Increased  Goal: Skin Health and Integrity  Outcome: Ongoing, Progressing     Problem: Impaired Wound Healing  Goal: Optimal Wound Healing  Outcome: Ongoing, Progressing     Problem: Fall Injury Risk  Goal: Absence of Fall and Fall-Related Injury  Outcome: Ongoing, Progressing

## 2023-10-13 NOTE — ED PROVIDER NOTES
Encounter Date: 10/12/2023    SCRIBE #1 NOTE: I, Demondguero Marquez, am scribing for, and in the presence of,  Roxy Rubio MD. I have scribed the following portions of the note - Other sections scribed: HPI, ROS, PE.   SCRIBE #2 NOTE: I, Caroline Plascencia, am scribing for, and in the presence of,  Roxy Rubio MD. I have scribed the remaining portions of the note not scribed by Scribe #1.       Chief Complaint   Patient presents with    Abdominal Pain     Complaining of lower abd pain, pain near abd drain site. States drain output has decreased since Monday. Patient recently moved from [place] and does not have care established in [place]. Hx of colon cancer. Denies vomiting, diarrhea, fevers.     Alex Ley is a 53 y.o. female with a PMHx of colon cancer who presents to the ED with lower abdominal pain. Onset was 3 days ago. Patient describes her lower abdominal pain as a nagging pain and rates it a 10/10. Until today she had been living in [place] and receiving all of her care there. She just moved to [place] today to live with her daughter. She states that she was admitted to the hospital from April until today in which she was discharged. During that period of time she experienced similar lower abdominal pain for which she has been prescribed Percocet. Patient has a PSHx of a partial colectomy, and a PSHx of paracentesis that was performed 2 weeks ago. Patient states that she was diagnosed with colon cancer in April, and received chemotherapy. Patient granddaughter reports chemotherapy being stopped due to the patients overall strength. In addition, patient states that the last time her paracentesis drain was emptied was on Monday. She states she normally lays on her left side, but her drain terminates in the right lower quadrant. According to the patients son the patient drain usually fills up to about 300 ml-400 ml a day t.i.d. He states that he notices that the more she eats the more output is  seen. Denies having any associated fever, vomiting, nausea, and any trouble having a BM. This is the extent of the patients complaints at this time.    The history is provided by the patient, a relative and medical records. No  was used.     Review of patient's allergies indicates:  No Known Allergies  Past Medical History:   Diagnosis Date    Acute kidney injury superimposed on chronic kidney disease     Ascites     Colon cancer      History reviewed. No pertinent surgical history.  History reviewed. No pertinent family history.  Social History     Tobacco Use    Smoking status: Never    Smokeless tobacco: Never   Substance Use Topics    Alcohol use: Never    Drug use: Never     Review of Systems   Constitutional: Negative.  Negative for fever.   HENT: Negative.  Negative for sore throat.    Eyes: Negative.    Respiratory: Negative.  Negative for shortness of breath.    Cardiovascular: Negative.  Negative for chest pain.   Gastrointestinal:  Positive for abdominal pain. Negative for abdominal distention, constipation, diarrhea, nausea and vomiting.   Endocrine: Negative.    Genitourinary:  Negative for difficulty urinating, dysuria, frequency and hematuria.   Musculoskeletal: Negative.  Negative for back pain.   Skin: Negative.  Negative for rash.   Allergic/Immunologic: Negative.    Neurological: Negative.  Negative for weakness.   Hematological: Negative.  Does not bruise/bleed easily.   Psychiatric/Behavioral: Negative.         Physical Exam     Initial Vitals [10/12/23 2258]   BP Pulse Resp Temp SpO2   (!) 83/57 85 18 97.5 °F (36.4 °C) 98 %      MAP       --         Physical Exam    Nursing note and vitals reviewed.  Constitutional: She appears well-developed and well-nourished. She is not diaphoretic. No distress.   Chronically ill appearing.    HENT:   Head: Normocephalic and atraumatic.   Eyes: Conjunctivae and EOM are normal. Pupils are equal, round, and reactive to light.   Neck: Neck  supple. No JVD present.   Normal range of motion.  Cardiovascular:  Normal rate, regular rhythm, normal heart sounds and intact distal pulses.     Exam reveals no gallop and no friction rub.       No murmur heard.  Pulmonary/Chest: Breath sounds normal. Tachypnea noted. No respiratory distress. She has no wheezes. She has no rhonchi. She has no rales. She exhibits no tenderness.   Abdominal: Abdomen is soft. Bowel sounds are normal. She exhibits no mass. Tenderness: diffuse.   Tunneled catheter in right lower quadrant.  Abdomen distended but soft.  Mild lower abdominal tenderness to deep palpation.  No rebound or guarding.  Well-healed midline surgical incision. There is no rebound and no guarding.   Musculoskeletal:         General: Normal range of motion.      Cervical back: Normal range of motion and neck supple.     Neurological: She is alert and oriented to person, place, and time. She has normal strength. GCS score is 15. GCS eye subscore is 4. GCS verbal subscore is 5. GCS motor subscore is 6.   Skin: Skin is warm.   Psychiatric: Thought content normal.         ED Course   Procedures  Labs Reviewed   CBC W/ AUTO DIFFERENTIAL - Abnormal; Notable for the following components:       Result Value    WBC 19.11 (*)     MCHC 31.1 (*)     RDW 17.2 (*)     Immature Granulocytes 0.6 (*)     Gran # (ANC) 17.9 (*)     Immature Grans (Abs) 0.12 (*)     Lymph # 0.4 (*)     Gran % 93.5 (*)     Lymph % 2.1 (*)     Mono % 3.6 (*)     All other components within normal limits   COMPREHENSIVE METABOLIC PANEL - Abnormal; Notable for the following components:    CO2 21 (*)     BUN 64 (*)     Creatinine 2.1 (*)     Calcium 8.5 (*)     Albumin 2.0 (*)     Alkaline Phosphatase 191 (*)     eGFR 27 (*)     All other components within normal limits   CULTURE, BLOOD   CULTURE, BLOOD   URINALYSIS, REFLEX TO URINE CULTURE   ISTAT LACTATE          Imaging Results               CT Abdomen Pelvis  Without Contrast (Final result)  Result time  10/13/23 01:18:24      Final result by Flora Root MD (10/13/23 01:18:24)                   Impression:      Large pneumoperitoneum.  Partial colectomy.  Question the integrity of the suture material in the left upper abdomen.    Multiple low-attenuation hepatic lesions.  Findings concerning for liver metastases.    Small gallbladder sludge or gravel-like gallstones.    Large abdominopelvic free fluid.  Anasarca.    Large soft tissue density in the retroperitoneum.  Findings may be due to extensive adenopathy, evolved hematoma or decompressed bowel.    Small left pleural effusion.    Findings called to Dr. Field at 01:03 on 10/13/2023.    This report was flagged in Epic as abnormal.      Electronically signed by: Flora Root  Date:    10/13/2023  Time:    01:18               Narrative:    EXAMINATION:  CT ABDOMEN PELVIS WITHOUT    CLINICAL HISTORY:  Complaining of lower abdominal pain near the drain site.  States drain output is decreased since Monday.  History of colon cancer. 123    TECHNIQUE:  5 mm unenhanced axial images from the lung bases through the greater trochanters were performed.  Coronal and sagittal reformatted images were provided.    COMPARISON:  None.    FINDINGS:  Large pneumoperitoneum is present.    Within the limits of a noncontrast examination, multiple rounded low-attenuation lesions are seen within the liver.  One the largest of these lesions measures 2.6 x 2.2 cm (series 2 axial image 34).    Spleen, pancreas, and adrenal glands are unremarkable.  The gallbladder contains small amount of dependent hyperdensity.    There is a right ventral percutaneous catheter at the level of the upper pelvis, which terminates with its tip in the left upper abdomen.    There is a left ureteral stent.  The right kidney is atrophic.    There is large abdominal and pelvic fluid.  Anasarca.    There is large soft tissue density in the retroperitoneal region.    There is partial colectomy.  Residual  contrast is seen in the colon and rectum.  There are no pelvic adenopathy.  The urinary bladder is distended without wall thickening.    At the lung bases, there is small left pleural effusion with associated compressive atelectasis.                                       Medications   morphine 12 hr tablet 30 mg (has no administration in time range)   oxyCODONE-acetaminophen  mg per tablet 1 tablet (has no administration in time range)   midodrine tablet 5 mg (has no administration in time range)   sodium chloride 0.9% flush 10 mL (has no administration in time range)   acetaminophen tablet 650 mg (has no administration in time range)   naloxone 0.4 mg/mL injection 0.02 mg (has no administration in time range)   0.9%  NaCl infusion (has no administration in time range)   ondansetron injection 4 mg (has no administration in time range)   morphine injection 4 mg (has no administration in time range)   piperacillin-tazobactam (ZOSYN) 4.5 g in dextrose 5 % in water (D5W) 100 mL IVPB (MB+) (has no administration in time range)   oxyCODONE-acetaminophen 5-325 mg per tablet 1 tablet (1 tablet Oral Given 10/12/23 2332)   morphine injection 2 mg (2 mg Intravenous Given 10/13/23 0142)   piperacillin-tazobactam (ZOSYN) 4.5 g in dextrose 5 % in water (D5W) 100 mL IVPB (MB+) (0 g Intravenous Stopped 10/13/23 0224)   0.9%  NaCl infusion (1,000 mLs Intravenous New Bag 10/13/23 0221)     Medical Decision Making  53-year-old female with history of colon cancer status post resection with recurrent malignant ascites status post peritoneal catheter placement several months ago presents with report of lower abdominal pain and decreased output from her catheter for the past 3 days.  Patient is chronically ill-appearing but does not appear acutely ill.  On exam she only had mild tenderness to the lower abdomen near the site of her catheter.  Workup today remarkable for leukocytosis of 19,000 with a left shift.  Elevated creatinine  at baseline compared to lab work results from July (daughter has a copy of some of her blood work).  CT shows multiple abnormalities including large hemoperitoneum, large abdominal pelvic free fluid and questionable integrity of the suture material in the left upper abdomen.  Unfortunately, there are no hold imaging available for review, but I suspect these are likely chronic findings given her relatively benign abdominal exam and minimal symptoms.  She is mainly here requesting to have the catheter removed.  Given the very limited medical history we have, she will placed in observation for General surgery evaluation and to help coordinate her outpatient care now that she has moved to [place] to live with her daughter.  Given the possibility of intra-abdominal infection, she also received a dose of Zosyn.    Amount and/or Complexity of Data Reviewed  External Data Reviewed: notes.  Labs: ordered.  Radiology: ordered.    Risk  Prescription drug management.            Scribe Attestation:   Scribe #1: I performed the above scribed service and the documentation accurately describes the services I performed. I attest to the accuracy of the note.  Scribe #2: I performed the above scribed service and the documentation accurately describes the services I performed. I attest to the accuracy of the note.        ED Course as of 10/13/23 0319   Thu Oct 12, 2023   232 53-year-old female with history of colon cancer presents complaining of lower abdominal pain.  Until today she had been living in [place] and receiving all of her care there.  She just moved to [place] today to live with her daughter.  She states that she was admitted to the hospital from April until today.  During that period of time she experienced similar lower abdominal pain for which she has been prescribed Percocet.  She has an intra-abdominal drain in place which she states has been in for 2 weeks and they had considered removing it prior to  discharge, but for unclear reasons it was left in place.  She is concerned that it may be infected.  She states she raise this concern at the hospital prior to being discharged and was told everything was fine, but she would like a 2nd opinion.  Triage vital signs significant for hypotension but otherwise within normal limits.  I suspect that this is likely her baseline.  She denies any systemic symptoms or other complaints aside lower abdominal pain.    On exam her abdomen is distended but soft.  The canister connected to her drain is empty.  Bedside ultrasound shows moderate amount of ascites present.  She states she normally lays on her left side, but her drain terminates in the right lower quadrant.  Will have her repositioned in bed to see if this causes increased drainage.    Unfortunately, there are no outside records available for review.  From the history provided by the patient and her daughter who is at the bedside, it appears that she has end-stage colon cancer and the plan was for palliative care only.  Will obtain lab work and give oral analgesics. [AL]   Fri Oct 13, 2023   0151 Clarification: Daughter states that the patient has been in and out of the hospital since being diagnosed in April with colon cancer.  She last had chemo in August and was transitioned to Hospice.  She was last discharged today after a 1 day admission for lower abdominal pain and no output from catheter x 3 days.   [AA]      ED Course User Index  [AA] Roxy Rubio MD  [AL] Caroline Plascencia                      Clinical Impression:   Final diagnoses:  [K66.8] Pneumoperitoneum        ED Disposition Condition    Observation            Physician Attestation for Scribe: IRoxy  , reviewed documentation as scribed in my presence, which is both accurate and complete.      Roxy Rubio MD  10/13/23 0334

## 2023-10-13 NOTE — ED NOTES
53 year old female presents to ED c/o abdominal pain near the drain site (RLQ) and decreased drain output since Monday. A&Ox4. Denies any other complaints. ED workup in progress. VSS. Safety measures in place; side rails up x2. Call light within pt reach.

## 2023-10-13 NOTE — SUBJECTIVE & OBJECTIVE
Interval History: Pt feeling nauseous. Adjustments made. CM, Pall and hospice working on safe discharge plans with hospice.     Review of Systems   Constitutional:  Positive for appetite change and fatigue. Negative for activity change and fever.   HENT:  Negative for congestion, ear pain, rhinorrhea and sinus pressure.    Eyes:  Negative for pain and discharge.   Respiratory:  Negative for cough, chest tightness, shortness of breath and wheezing.    Cardiovascular:  Negative for chest pain and leg swelling.   Gastrointestinal:  Positive for abdominal pain, nausea and vomiting. Negative for abdominal distention and diarrhea.   Endocrine: Negative for cold intolerance and heat intolerance.   Genitourinary:  Negative for difficulty urinating, flank pain, frequency, hematuria and urgency.   Musculoskeletal:  Positive for myalgias. Negative for arthralgias and joint swelling.   Allergic/Immunologic: Negative for environmental allergies and food allergies.   Neurological:  Negative for dizziness, weakness, light-headedness and headaches.   Hematological:  Does not bruise/bleed easily.   Psychiatric/Behavioral:  Negative for agitation, behavioral problems and decreased concentration.      Objective:     Vital Signs (Most Recent):  Temp: 97.2 °F (36.2 °C) (10/13/23 1123)  Pulse: 62 (10/13/23 1321)  Resp: 14 (10/13/23 1321)  BP: 110/72 (10/13/23 1321)  SpO2: 95 % (10/13/23 1123) Vital Signs (24h Range):  Temp:  [97.2 °F (36.2 °C)-97.5 °F (36.4 °C)] 97.2 °F (36.2 °C)  Pulse:  [55-85] 62  Resp:  [14-18] 14  SpO2:  [95 %-100 %] 95 %  BP: ()/(57-72) 110/72     Weight: 37.4 kg (82 lb 7.2 oz)  Body mass index is 16.1 kg/m².    Intake/Output Summary (Last 24 hours) at 10/13/2023 1408  Last data filed at 10/13/2023 0224  Gross per 24 hour   Intake 100 ml   Output --   Net 100 ml         Physical Exam  Constitutional:       Appearance: She is well-developed and underweight. She is ill-appearing.   HENT:      Head:  Normocephalic.   Eyes:      General:         Right eye: No discharge.         Left eye: No discharge.      Conjunctiva/sclera: Conjunctivae normal.   Cardiovascular:      Rate and Rhythm: Normal rate and regular rhythm.      Pulses:           Radial pulses are 2+ on the right side and 2+ on the left side.      Heart sounds: Normal heart sounds.   Pulmonary:      Effort: Pulmonary effort is normal. No respiratory distress.      Breath sounds: Normal breath sounds.   Abdominal:      General: Bowel sounds are decreased. There is no distension.      Palpations: Abdomen is soft.      Tenderness: There is generalized abdominal tenderness.      Comments: Has drain in place    Musculoskeletal:         General: Normal range of motion.      Cervical back: Normal range of motion and neck supple.   Skin:     General: Skin is warm and dry.      Coloration: Skin is pale.   Neurological:      Mental Status: She is alert and oriented to person, place, and time.      GCS: GCS eye subscore is 4. GCS verbal subscore is 5. GCS motor subscore is 6.      Motor: Motor function is intact.   Psychiatric:         Mood and Affect: Mood is depressed.         Speech: Speech is delayed.         Behavior: Behavior is withdrawn.         Thought Content: Thought content normal.             Significant Labs: All pertinent labs within the past 24 hours have been reviewed.    Significant Imaging: I have reviewed all pertinent imaging results/findings within the past 24 hours.

## 2023-10-13 NOTE — ASSESSMENT & PLAN NOTE
- Cr 2.1 on recent labs; unclear baseline kidney function  - Due to low oncotic pressure, IV fluids may actually worsen her ascites and potentially result in pleural effusions; would be cautious with these

## 2023-10-13 NOTE — NURSING
Pt arrived to floor via stretcher accompanied by daughter. Awake and alert. Resp even and unlabored. No apparent distress noted. Upon assessment, paracentesis drain noted to LLQ and surgical dressing to abdomen. Dressing C/D/I.  Pt hypotensive with bp of 92/59 with MAP of 70. Santos Wiley notified at bedside. Order for scheduled midodrine noted. NS infusing at 75 mL/hr. Pt oriented to room and instructed to call for assistance if needed. Daughter at bedside. Bed locked in lowest position with SR up x 2. Call light and personal belongings within easy reach. Safety measures in place. Will continue monitor.

## 2023-10-13 NOTE — ASSESSMENT & PLAN NOTE
- Received care for this in [place]; ultimately was not a candidate for further oncological treatment and hospice was arranged ([facility]). Family decided to move her to [place], and is interested in arranging hospice care, likely in [facility].   - Illness trajectory education provided, as well as extensive overview of hospice care

## 2023-10-13 NOTE — SUBJECTIVE & OBJECTIVE
Past Medical History:   Diagnosis Date    Acute kidney injury superimposed on chronic kidney disease     Ascites     Colon cancer        History reviewed. No pertinent surgical history.    Review of patient's allergies indicates:  No Known Allergies    Medications:  Continuous Infusions:   sodium chloride 0.9% 75 mL/hr at 10/13/23 0355     Scheduled Meds:   midodrine  5 mg Oral Q8H    morphine  30 mg Oral BID    piperacillin-tazobactam (Zosyn) IV (PEDS and ADULTS) (extended infusion is not appropriate)  4.5 g Intravenous Q12H     PRN Meds:acetaminophen, morphine, naloxone, ondansetron, oxyCODONE-acetaminophen, sodium chloride 0.9%    Family History    None       Tobacco Use    Smoking status: Never    Smokeless tobacco: Never   Substance and Sexual Activity    Alcohol use: Never    Drug use: Never    Sexual activity: Not on file       Review of Systems   Constitutional:  Positive for fatigue and unexpected weight change.   Gastrointestinal:  Positive for abdominal distention and abdominal pain.     Objective:     Vital Signs (Most Recent):  Temp: 97.4 °F (36.3 °C) (10/13/23 0300)  Pulse: 67 (10/13/23 0831)  Resp: 15 (10/13/23 0742)  BP: (!) 87/59 (Rn shade notified) (10/13/23 0831)  SpO2: 100 % (10/13/23 0742) Vital Signs (24h Range):  Temp:  [97.4 °F (36.3 °C)-97.5 °F (36.4 °C)] 97.4 °F (36.3 °C)  Pulse:  [55-85] 67  Resp:  [14-18] 15  SpO2:  [98 %-100 %] 100 %  BP: (83-95)/(57-62) 87/59     Weight: 37.4 kg (82 lb 7.2 oz)  Body mass index is 16.1 kg/m².       Physical Exam  Constitutional:       Comments: Alert, visible cachexia, ill-appearing though appears comfortable    HENT:      Head:      Comments: Temporal wasting   Cardiovascular:      Rate and Rhythm: Normal rate.   Pulmonary:      Effort: Pulmonary effort is normal.   Neurological:      Comments: Able to speak in sentences       Significant Labs: All pertinent labs within the past 24 hours have been reviewed.  CBC:   Recent Labs   Lab 10/12/23  6408  "10/13/23  0936   WBC 19.11* 13.66*   HGB 12.4 11.8*   HCT 39.9 38.6   MCV 93 95     --      BMP:  Recent Labs   Lab 10/13/23  0936   GLU 91      K 4.0      CO2 22*   BUN 65*   CREATININE 2.1*   CALCIUM 8.4*   MG 2.4     LFT:  Lab Results   Component Value Date    AST 24 10/13/2023    ALKPHOS 185 (H) 10/13/2023    BILITOT 0.3 10/13/2023     Albumin:   Albumin   Date Value Ref Range Status   10/13/2023 2.0 (L) 3.5 - 5.2 g/dL Final     Protein:   Total Protein   Date Value Ref Range Status   10/13/2023 5.9 (L) 6.0 - 8.4 g/dL Final     Lactic acid:   No results found for: "LACTATE"    Significant Imaging: I have reviewed all pertinent imaging results/findings within the past 24 hours.    "

## 2023-10-13 NOTE — ASSESSMENT & PLAN NOTE
Vs CKD  No baseline creat to compare    Plan -   Monitor urine output and BMP   Avoid nephrotoxins and renally dose meds for GFR listed above.

## 2023-10-13 NOTE — PROGRESS NOTES
[facility] Medicine  Progress Note    Patient Name: Alex Ley  MRN: [id number]  Patient Class: OP- Observation   Admission Date: 10/12/2023  Length of Stay: 0 days  Attending Physician: Wesly Bean MD  Primary Care Provider: Izabel, Primary Doctor        Subjective:     Principal Problem:Pneumoperitoneum        HPI:  The patient is a 53 y.o. female with a past medical history of colon cancer who presents with lower abdominal pain. Onset was 3 days ago. Patient describes her lower abdominal pain as a nagging pain and rates it a 10/10. Until today she had been living in [place] and receiving all of her care there. She just moved to [place] today to live with her daughter. She states that she was admitted to the hospital from April until today in which she was discharged. During that period of time she experienced similar lower abdominal pain for which she has been prescribed Percocet. Patient has a PSHx of a partial colectomy, and a PSHx of paracentesis that was performed 2 weeks ago. Patient states that she was diagnosed with colon cancer in April, and received chemotherapy. Patient granddaughter reports chemotherapy being stopped due to the patients overall strength. In addition, patient states that the last time her paracentesis drain was emptied was on Monday. She states she normally lays on her left side, but her drain terminates in the right lower quadrant. According to the patients son the patient drain usually fills up to about 300 ml-400 ml a day t.i.d. He states that he notices that the more she eats the more output is seen. Denies having any associated fever, vomiting, nausea, and any trouble having a BM.  The patient will be admitted for further management of her abdominal pain with Palliative Care consult and assistance with outpatient hospice arrangements.      Overview/Hospital Course:  Pt stable. Feeling nauseous other than that no complaints. Has been meeting  with , palliative and hospice throughout the day. Currently working on a safe discharge plan.       Interval History: Pt feeling nauseous. Adjustments made. CM, Pall and hospice working on safe discharge plans with hospice.     Review of Systems   Constitutional:  Positive for appetite change and fatigue. Negative for activity change and fever.   HENT:  Negative for congestion, ear pain, rhinorrhea and sinus pressure.    Eyes:  Negative for pain and discharge.   Respiratory:  Negative for cough, chest tightness, shortness of breath and wheezing.    Cardiovascular:  Negative for chest pain and leg swelling.   Gastrointestinal:  Positive for abdominal pain, nausea and vomiting. Negative for abdominal distention and diarrhea.   Endocrine: Negative for cold intolerance and heat intolerance.   Genitourinary:  Negative for difficulty urinating, flank pain, frequency, hematuria and urgency.   Musculoskeletal:  Positive for myalgias. Negative for arthralgias and joint swelling.   Allergic/Immunologic: Negative for environmental allergies and food allergies.   Neurological:  Negative for dizziness, weakness, light-headedness and headaches.   Hematological:  Does not bruise/bleed easily.   Psychiatric/Behavioral:  Negative for agitation, behavioral problems and decreased concentration.      Objective:     Vital Signs (Most Recent):  Temp: 97.2 °F (36.2 °C) (10/13/23 1123)  Pulse: 62 (10/13/23 1321)  Resp: 14 (10/13/23 1321)  BP: 110/72 (10/13/23 1321)  SpO2: 95 % (10/13/23 1123) Vital Signs (24h Range):  Temp:  [97.2 °F (36.2 °C)-97.5 °F (36.4 °C)] 97.2 °F (36.2 °C)  Pulse:  [55-85] 62  Resp:  [14-18] 14  SpO2:  [95 %-100 %] 95 %  BP: ()/(57-72) 110/72     Weight: 37.4 kg (82 lb 7.2 oz)  Body mass index is 16.1 kg/m².    Intake/Output Summary (Last 24 hours) at 10/13/2023 5375  Last data filed at 10/13/2023 0220  Gross per 24 hour   Intake 100 ml   Output --   Net 100 ml         Physical  Exam  Constitutional:       Appearance: She is well-developed and underweight. She is ill-appearing.   HENT:      Head: Normocephalic.   Eyes:      General:         Right eye: No discharge.         Left eye: No discharge.      Conjunctiva/sclera: Conjunctivae normal.   Cardiovascular:      Rate and Rhythm: Normal rate and regular rhythm.      Pulses:           Radial pulses are 2+ on the right side and 2+ on the left side.      Heart sounds: Normal heart sounds.   Pulmonary:      Effort: Pulmonary effort is normal. No respiratory distress.      Breath sounds: Normal breath sounds.   Abdominal:      General: Bowel sounds are decreased. There is no distension.      Palpations: Abdomen is soft.      Tenderness: There is generalized abdominal tenderness.      Comments: Has drain in place    Musculoskeletal:         General: Normal range of motion.      Cervical back: Normal range of motion and neck supple.   Skin:     General: Skin is warm and dry.      Coloration: Skin is pale.   Neurological:      Mental Status: She is alert and oriented to person, place, and time.      GCS: GCS eye subscore is 4. GCS verbal subscore is 5. GCS motor subscore is 6.      Motor: Motor function is intact.   Psychiatric:         Mood and Affect: Mood is depressed.         Speech: Speech is delayed.         Behavior: Behavior is withdrawn.         Thought Content: Thought content normal.             Significant Labs: All pertinent labs within the past 24 hours have been reviewed.    Significant Imaging: I have reviewed all pertinent imaging results/findings within the past 24 hours.      Assessment/Plan:      * Pneumoperitoneum  Metastatic colon cancer to liver   Advanced care planning     CT- Large pneumoperitoneum.  Partial colectomy.  Question the integrity of the suture material in the left upper abdomen.  Multiple low-attenuation hepatic lesions.  Findings concerning for liver metastases.  Small gallbladder sludge or gravel-like  gallstones.  Large abdominopelvic free fluid.  Anasarca.  Large soft tissue density in the retroperitoneum.  Findings may be due to extensive adenopathy, evolved hematoma or decompressed bowel.    Goals are clear for hospice care at home.     Plan -   IVF  Clear liquid diet  Morphine/Percocet/IV morphine  Elevated WBC- Zosyn for now  Increasing abdominal pain, likely all due to progressing colon ca  Palliative Care/Social Work consult to assist with setting up Hospice in the area.   Antiemetics          Severe protein-calorie malnutrition  Nutrition consulted. Most recent weight and BMI monitored-     Measurements:  Wt Readings from Last 1 Encounters:   10/13/23 37.4 kg (82 lb 7.2 oz)   Body mass index is 16.1 kg/m².           Acute kidney injury  Vs CKD  No baseline creat to compare    Plan -   Monitor urine output and BMP   Avoid nephrotoxins and renally dose meds for GFR listed above.              VTE Risk Mitigation (From admission, onward)           Ordered     IP VTE LOW RISK PATIENT  Once         10/13/23 0313     Place sequential compression device  Until discontinued         10/13/23 0313     Reason for No Pharmacological VTE Prophylaxis  Once        Question:  Reasons:  Answer:  Risk of Bleeding    10/13/23 0313                    Discharge Planning   RESHMA:      Code Status: Full Code   Is the patient medically ready for discharge?:     Reason for patient still in hospital (select all that apply): Treatment  Discharge Plan A: Hospice/home   Discharge Delays: None known at this time              Wesly Ferrer MD  Department of Hospital Medicine   [facility] Surg ([place])

## 2023-10-13 NOTE — ASSESSMENT & PLAN NOTE
CT- Large pneumoperitoneum.  Partial colectomy.  Question the integrity of the suture material in the left upper abdomen.  Multiple low-attenuation hepatic lesions.  Findings concerning for liver metastases.  Small gallbladder sludge or gravel-like gallstones.  Large abdominopelvic free fluid.  Anasarca.  Large soft tissue density in the retroperitoneum.  Findings may be due to extensive adenopathy, evolved hematoma or decompressed bowel.    IVF  Clear liquid diet  Morphine/Percocet/IV morphine  Elevated WBC- Zosyn for now  Increasing abdominal pain, likely all due to progressing colon ca  Palliative Care/Social Work consult to assist with setting up Hospice in the area.

## 2023-10-13 NOTE — ED TRIAGE NOTES
Reports of having abdominal pain near the drain site (RLQ) and decreased drain output since Monday. Patient states that she was admitted from another hospital and opted to go here. Patient has a drain tube connected to a bottle on her RLQ.

## 2023-10-14 VITALS
RESPIRATION RATE: 18 BRPM | BODY MASS INDEX: 16.18 KG/M2 | TEMPERATURE: 95 F | HEART RATE: 56 BPM | SYSTOLIC BLOOD PRESSURE: 93 MMHG | HEIGHT: 60 IN | WEIGHT: 82.44 LBS | DIASTOLIC BLOOD PRESSURE: 57 MMHG | OXYGEN SATURATION: 99 %

## 2023-10-14 LAB
ABO + RH BLD: NORMAL
BLD GP AB SCN CELLS X3 SERPL QL: NORMAL
ERYTHROCYTE [DISTWIDTH] IN BLOOD BY AUTOMATED COUNT: 17 % (ref 11.5–14.5)
HCT VFR BLD AUTO: 34.7 % (ref 37–48.5)
HGB BLD-MCNC: 10.6 G/DL (ref 12–16)
MCH RBC QN AUTO: 29.5 PG (ref 27–31)
MCHC RBC AUTO-ENTMCNC: 30.5 G/DL (ref 32–36)
MCV RBC AUTO: 97 FL (ref 82–98)
PLATELET # BLD AUTO: 126 K/UL (ref 150–450)
PMV BLD AUTO: 12.4 FL (ref 9.2–12.9)
RBC # BLD AUTO: 3.59 M/UL (ref 4–5.4)
SPECIMEN OUTDATE: NORMAL
WBC # BLD AUTO: 16.69 K/UL (ref 3.9–12.7)

## 2023-10-14 PROCEDURE — 85027 COMPLETE CBC AUTOMATED: CPT | Performed by: STUDENT IN AN ORGANIZED HEALTH CARE EDUCATION/TRAINING PROGRAM

## 2023-10-14 PROCEDURE — 99239 PR HOSPITAL DISCHARGE DAY,>30 MIN: ICD-10-PCS | Mod: ,,, | Performed by: STUDENT IN AN ORGANIZED HEALTH CARE EDUCATION/TRAINING PROGRAM

## 2023-10-14 PROCEDURE — 63600175 PHARM REV CODE 636 W HCPCS: Performed by: STUDENT IN AN ORGANIZED HEALTH CARE EDUCATION/TRAINING PROGRAM

## 2023-10-14 PROCEDURE — 96366 THER/PROPH/DIAG IV INF ADDON: CPT

## 2023-10-14 PROCEDURE — 86900 BLOOD TYPING SEROLOGIC ABO: CPT | Performed by: STUDENT IN AN ORGANIZED HEALTH CARE EDUCATION/TRAINING PROGRAM

## 2023-10-14 PROCEDURE — 96376 TX/PRO/DX INJ SAME DRUG ADON: CPT

## 2023-10-14 PROCEDURE — G0378 HOSPITAL OBSERVATION PER HR: HCPCS

## 2023-10-14 PROCEDURE — 36415 COLL VENOUS BLD VENIPUNCTURE: CPT | Performed by: STUDENT IN AN ORGANIZED HEALTH CARE EDUCATION/TRAINING PROGRAM

## 2023-10-14 PROCEDURE — 96361 HYDRATE IV INFUSION ADD-ON: CPT

## 2023-10-14 PROCEDURE — 99239 HOSP IP/OBS DSCHRG MGMT >30: CPT | Mod: ,,, | Performed by: STUDENT IN AN ORGANIZED HEALTH CARE EDUCATION/TRAINING PROGRAM

## 2023-10-14 PROCEDURE — 25000003 PHARM REV CODE 250: Performed by: NURSE PRACTITIONER

## 2023-10-14 PROCEDURE — 25000003 PHARM REV CODE 250: Performed by: STUDENT IN AN ORGANIZED HEALTH CARE EDUCATION/TRAINING PROGRAM

## 2023-10-14 PROCEDURE — 63600175 PHARM REV CODE 636 W HCPCS: Performed by: NURSE PRACTITIONER

## 2023-10-14 RX ORDER — OXYCODONE AND ACETAMINOPHEN 10; 325 MG/1; MG/1
1 TABLET ORAL ONCE
Status: DISCONTINUED | OUTPATIENT
Start: 2023-10-14 | End: 2023-10-14 | Stop reason: HOSPADM

## 2023-10-14 RX ORDER — ONDANSETRON 2 MG/ML
4 INJECTION INTRAMUSCULAR; INTRAVENOUS EVERY 6 HOURS PRN
Start: 2023-10-14

## 2023-10-14 RX ORDER — OXYCODONE AND ACETAMINOPHEN 10; 325 MG/1; MG/1
1 TABLET ORAL EVERY 6 HOURS PRN
Status: DISCONTINUED | OUTPATIENT
Start: 2023-10-14 | End: 2023-10-14 | Stop reason: HOSPADM

## 2023-10-14 RX ADMIN — MIDODRINE HYDROCHLORIDE 5 MG: 5 TABLET ORAL at 05:10

## 2023-10-14 RX ADMIN — ONDANSETRON 4 MG: 2 INJECTION INTRAMUSCULAR; INTRAVENOUS at 02:10

## 2023-10-14 RX ADMIN — OXYCODONE AND ACETAMINOPHEN 1 TABLET: 10; 325 TABLET ORAL at 09:10

## 2023-10-14 RX ADMIN — SODIUM CHLORIDE, POTASSIUM CHLORIDE, SODIUM LACTATE AND CALCIUM CHLORIDE 250 ML: 600; 310; 30; 20 INJECTION, SOLUTION INTRAVENOUS at 07:10

## 2023-10-14 RX ADMIN — OXYCODONE HYDROCHLORIDE AND ACETAMINOPHEN 1 TABLET: 10; 325 TABLET ORAL at 02:10

## 2023-10-14 RX ADMIN — ONDANSETRON 4 MG: 2 INJECTION INTRAMUSCULAR; INTRAVENOUS at 08:10

## 2023-10-14 RX ADMIN — PIPERACILLIN AND TAZOBACTAM 4.5 G: 4; .5 INJECTION, POWDER, LYOPHILIZED, FOR SOLUTION INTRAVENOUS; PARENTERAL at 02:10

## 2023-10-14 RX ADMIN — OXYCODONE HYDROCHLORIDE AND ACETAMINOPHEN 1 TABLET: 10; 325 TABLET ORAL at 09:10

## 2023-10-14 NOTE — PLAN OF CARE
[facility]  Department of Hospital Medicine  [address] [place] [address]  [phone] after hours  [phone] fax    HOSPICE  ORDERS    10/14/2023    Admit to Hospice:  Home Service Inpatient Service     Diagnoses:   Active Hospital Problems    Diagnosis  POA    *Pneumoperitoneum [K66.8]  Yes     Priority: 1 - High    Severe protein-calorie malnutrition [E43]  Yes     Priority: 2     Acute kidney injury [N17.9]  Yes     Priority: 3     Metastatic colon cancer to liver [C18.9, C78.7]  Yes    Advance care planning [Z71.89]  Not Applicable      Resolved Hospital Problems   No resolved problems to display.       Hospice Qualifying Diagnoses:        Patient has a life expectancy < 6 months due to:  Primary Hospice Diagnosis:   terminal metastatic cancer     Comorbid Conditions Contributing to Decline: pneumoperitoneum, protein calorie malnutrition    Vital Signs: Routine per Hospice Protocol.    Code Status: DNR    Allergies: Review of patient's allergies indicates:  No Known Allergies    Diet: Liquid/as tolerated     Activities: As tolerated    Goals of Care Treatment Preferences:  Code Status: DNR      Nursing: Per Hospice Routine.    Colostomy Care:  Empty bag every shift and prn                                             Change and clean site every 48 hours        Medications:          Medication List        START taking these medications      dextrose 5 % (D5W) SolP 50 mL with promethazine 25 mg/mL Soln 6.25 mg  Inject 6.25 mg into the vein every 6 (six) hours as needed.     ondansetron 4 mg/2 mL Soln  Inject 4 mg into the vein every 6 (six) hours as needed.            CONTINUE taking these medications      hyoscyamine 0.125 mg Subl  Commonly known as: LEVSIN/SL  Place 0.125 mg under the tongue every 4 (four) hours as needed.     midodrine 10 MG tablet  Commonly known as: PROAMATINE  Take 10 mg by mouth 3 (three) times daily. Take one-half tablet (5mg total) by  mouth in the morning and one-half tablet (5mg total) at noon and one-half tablet (5mg total) in the evening. Take with meals.     morphine 30 MG 12 hr tablet  Commonly known as: MS CONTIN  Take 30 mg by mouth 2 (two) times daily.     oxyCODONE-acetaminophen  mg per tablet  Commonly known as: PERCOCET  Take 1 tablet by mouth every 8 (eight) hours as needed for Pain.                  Future Orders:  Hospice Medical Director may dictate new orders for comfortable care measures & sign death certificate.        _________________________________  Wesly Ferrer MD  10/14/2023

## 2023-10-14 NOTE — PLAN OF CARE
Patient discharged to hospice care.  Problem: Adult Inpatient Plan of Care  Goal: Plan of Care Review  Outcome: Met  Goal: Patient-Specific Goal (Individualized)  Outcome: Met  Goal: Absence of Hospital-Acquired Illness or Injury  Outcome: Met  Goal: Optimal Comfort and Wellbeing  Outcome: Met  Goal: Readiness for Transition of Care  Outcome: Met     Problem: Coping Ineffective  Goal: Effective Coping  Outcome: Met     Problem: Fluid and Electrolyte Imbalance (Acute Kidney Injury/Impairment)  Goal: Fluid and Electrolyte Balance  Outcome: Met     Problem: Oral Intake Inadequate (Acute Kidney Injury/Impairment)  Goal: Optimal Nutrition Intake  Outcome: Met     Problem: Renal Function Impairment (Acute Kidney Injury/Impairment)  Goal: Effective Renal Function  Outcome: Met     Problem: Skin Injury Risk Increased  Goal: Skin Health and Integrity  Outcome: Met     Problem: Impaired Wound Healing  Goal: Optimal Wound Healing  Outcome: Met     Problem: Fall Injury Risk  Goal: Absence of Fall and Fall-Related Injury  Outcome: Met

## 2023-10-14 NOTE — PLAN OF CARE
Pt remained free of falls and injuries AAOx4. Pt agitated and irritable throughout the night. Encouraged expression of feelings, positive reinforcement  utilized. Purposeful hourly rounding performed. Pt swallows meds whole. Managed c/o abd pain with PRN Percocet. Pt has drain to abd, 30 mL output. Pt had several small BMs this shift with one large maroon- colored BM with clots noted. MD Vikas notified and assessing pt at bedside. Pt BP low 88/68, LR bolus of 250 mL given per MD, other VSS on room air. Bed low and locked, bed alarm on, call light in reach. Side rails up x3. Report given to PHAN Brasher and PHAN Nunn.

## 2023-10-14 NOTE — DISCHARGE SUMMARY
[facility] Medicine  Discharge Summary      Patient Name: Alex Ley  MRN: [id number]  [facility]: [id number]  Patient Class: OP- Observation  Admission Date: 10/12/2023  Hospital Length of Stay: 0 days  Discharge Date and Time:  10/14/2023 11:52 AM  Attending Physician: Wesly Bean MD   Discharging Provider: Wesly Frerer MD  Primary Care Provider: Izabel Primary Doctor    Primary Care Team: Networked reference to record PCT     HPI:   The patient is a 53 y.o. female with a past medical history of colon cancer who presents with lower abdominal pain. Onset was 3 days ago. Patient describes her lower abdominal pain as a nagging pain and rates it a 10/10. Until today she had been living in [place] and receiving all of her care there. She just moved to [place] today to live with her daughter. She states that she was admitted to the hospital from April until today in which she was discharged. During that period of time she experienced similar lower abdominal pain for which she has been prescribed Percocet. Patient has a PSHx of a partial colectomy, and a PSHx of paracentesis that was performed 2 weeks ago. Patient states that she was diagnosed with colon cancer in April, and received chemotherapy. Patient granddaughter reports chemotherapy being stopped due to the patients overall strength. In addition, patient states that the last time her paracentesis drain was emptied was on Monday. She states she normally lays on her left side, but her drain terminates in the right lower quadrant. According to the patients son the patient drain usually fills up to about 300 ml-400 ml a day t.i.d. He states that he notices that the more she eats the more output is seen. Denies having any associated fever, vomiting, nausea, and any trouble having a BM.  The patient will be admitted for further management of her abdominal pain with Palliative Care consult and assistance with outpatient hospice  arrangements.        Hospital Course:   Pt stable. Alert and oriented with clear plans for hospice on discharge. Feeling nauseous other than that no complaints. Medciations were adjusted. Also managing pain. Has been meeting with , palliative and hospice throughout the day. Currently working on a safe discharge plan. Accepted with hospice with [facility] and plan for d/c home today. Pt had 1 bloody BM on morning of discharge - but given plan for home with hospice no further action was taken. Patient had no further bleeding. Discussed with patient and family in detail about this and they confirm their goals of comfort and home with hospice.        Goals of Care Treatment Preferences:  Code Status: DNR      Consults:   Consults (From admission, onward)          Status Ordering Provider     Inpatient consult to Registered Dietitian/Nutritionist  Once        Provider:  (Not yet assigned)    Acknowledged ROMEO WANG     Inpatient consult to Social Work  Once        Provider:  (Not yet assigned)    Completed DELPHINE OLIVAS     IP consult to case management  Once        Provider:  (Not yet assigned)    Completed ROMEO WANG     Inpatient consult to Palliative Care  Once        Provider:  (Not yet assigned)    Completed DELPHINE OLIVAS            No new Assessment & Plan notes have been filed under this hospital service since the last note was generated.  Service: Hospital Medicine    Final Active Diagnoses:    Diagnosis Date Noted POA    PRINCIPAL PROBLEM:  Pneumoperitoneum [K66.8] 10/13/2023 Yes    Severe protein-calorie malnutrition [E43] 10/13/2023 Yes    Acute kidney injury [N17.9] 10/13/2023 Yes    Metastatic colon cancer to liver [C18.9, C78.7] 10/13/2023 Yes    Advance care planning [Z71.89] 10/13/2023 Not Applicable      Problems Resolved During this Admission:       Discharged Condition: good        Medications:  Reconciled Home Medications:      Medication List        START taking  these medications      dextrose 5 % (D5W) SolP 50 mL with promethazine 25 mg/mL Soln 6.25 mg  Inject 6.25 mg into the vein every 6 (six) hours as needed.     ondansetron 4 mg/2 mL Soln  Inject 4 mg into the vein every 6 (six) hours as needed.            CONTINUE taking these medications      hyoscyamine 0.125 mg Subl  Commonly known as: LEVSIN/SL  Place 0.125 mg under the tongue every 4 (four) hours as needed.     midodrine 10 MG tablet  Commonly known as: PROAMATINE  Take 10 mg by mouth 3 (three) times daily. Take one-half tablet (5mg total) by mouth in the morning and one-half tablet (5mg total) at noon and one-half tablet (5mg total) in the evening. Take with meals.     morphine 30 MG 12 hr tablet  Commonly known as: MS CONTIN  Take 30 mg by mouth 2 (two) times daily.     oxyCODONE-acetaminophen  mg per tablet  Commonly known as: PERCOCET  Take 1 tablet by mouth every 8 (eight) hours as needed for Pain.                    Time spent on the discharge of patient: 35 minutes         Wesly Ferrer MD  Department of Hospital Medicine  [facility] ([employer])

## 2023-10-14 NOTE — PLAN OF CARE
Met with patient &  at bedside to discuss discharge - eager to discharge home - accepted by [facility] - they will meet patient once home to complete admission - [facility] ([facility]) updated -  will provide transportation home     [facility] ([place])  Discharge Final Note    Primary Care Provider: Izabel, Primary Doctor    Expected Discharge Date: 10/14/2023    Final Discharge Note (most recent)       Final Note - 10/14/23 1043          Final Note    Assessment Type Final Discharge Note     Anticipated Discharge Disposition Hospice/Home     What phone number can be called within the next 1-3 days to see how you are doing after discharge? [phone]     Hospital Resources/Appts/Education Provided Provided patient/caregiver with written discharge plan information        Post-Acute Status    Post-Acute Authorization Hospice     Hospice Status Set-up Complete/Auth obtained     Discharge Delays None known at this time                   Contact Info       [facility] - [address]  [place] [address]   Phone: [phone]       Next Steps: Follow up    Instructions: they will contact you to schedule home visits

## 2023-10-18 LAB
BACTERIA BLD CULT: NORMAL
BACTERIA BLD CULT: NORMAL

## 2024-01-15 PROBLEM — N17.9 ACUTE KIDNEY INJURY: Status: RESOLVED | Noted: 2023-10-13 | Resolved: 2024-01-15
